# Patient Record
Sex: FEMALE | Race: WHITE
[De-identification: names, ages, dates, MRNs, and addresses within clinical notes are randomized per-mention and may not be internally consistent; named-entity substitution may affect disease eponyms.]

---

## 2017-05-09 ENCOUNTER — HOSPITAL ENCOUNTER (INPATIENT)
Dept: HOSPITAL 10 - E/R | Age: 77
LOS: 8 days | Discharge: NURSING, REHAB OR LONG-TERM CARE | DRG: 481 | End: 2017-05-17
Attending: INTERNAL MEDICINE | Admitting: INTERNAL MEDICINE
Payer: MEDICARE

## 2017-05-09 VITALS
WEIGHT: 162.92 LBS | WEIGHT: 162.92 LBS | HEIGHT: 62 IN | BODY MASS INDEX: 29.98 KG/M2 | BODY MASS INDEX: 29.98 KG/M2 | HEIGHT: 62 IN

## 2017-05-09 DIAGNOSIS — Z90.49: ICD-10-CM

## 2017-05-09 DIAGNOSIS — S72.142A: Primary | ICD-10-CM

## 2017-05-09 DIAGNOSIS — I10: ICD-10-CM

## 2017-05-09 DIAGNOSIS — R33.9: ICD-10-CM

## 2017-05-09 DIAGNOSIS — M19.90: ICD-10-CM

## 2017-05-09 DIAGNOSIS — R79.89: ICD-10-CM

## 2017-05-09 DIAGNOSIS — Z90.11: ICD-10-CM

## 2017-05-09 DIAGNOSIS — Z85.3: ICD-10-CM

## 2017-05-09 DIAGNOSIS — H40.9: ICD-10-CM

## 2017-05-09 DIAGNOSIS — N39.0: ICD-10-CM
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
## 2017-05-09 DIAGNOSIS — E88.81: ICD-10-CM

## 2017-05-09 LAB
ADD SCAN DIFF: NO
ALBUMIN SERPL-MCNC: 4 G/DL (ref 3.3–4.9)
ALBUMIN/GLOB SERPL: 1.29 {RATIO}
ALP SERPL-CCNC: 114 IU/L (ref 42–121)
ALT SERPL-CCNC: 27 IU/L (ref 13–69)
ANION GAP SERPL CALC-SCNC: 17 MMOL/L (ref 8–16)
APTT BLD: 25.5 SEC (ref 25–35)
AST SERPL-CCNC: 25 IU/L (ref 15–46)
BASOPHILS # BLD AUTO: 0 10^3/UL (ref 0–0.1)
BASOPHILS NFR BLD: 0.3 % (ref 0–2)
BILIRUB DIRECT SERPL-MCNC: 0 MG/DL (ref 0–0.2)
BILIRUB SERPL-MCNC: 0.2 MG/DL (ref 0.2–1.3)
BUN SERPL-MCNC: 19 MG/DL (ref 7–20)
CALCIUM SERPL-MCNC: 9 MG/DL (ref 8.4–10.2)
CHLORIDE SERPL-SCNC: 102 MMOL/L (ref 97–110)
CO2 SERPL-SCNC: 26 MMOL/L (ref 21–31)
CREAT SERPL-MCNC: 0.7 MG/DL (ref 0.44–1)
EOSINOPHIL # BLD: 0.1 10^3/UL (ref 0–0.5)
EOSINOPHIL NFR BLD: 0.8 % (ref 0–7)
ERYTHROCYTE [DISTWIDTH] IN BLOOD BY AUTOMATED COUNT: 13.4 % (ref 11.5–14.5)
GLOBULIN SER-MCNC: 3.1 G/DL (ref 1.3–3.2)
GLUCOSE SERPL-MCNC: 164 MG/DL (ref 70–220)
HCT VFR BLD CALC: 41.7 % (ref 37–47)
HGB BLD-MCNC: 13.8 G/DL (ref 12–16)
INR PPP: 0.92
LYMPHOCYTES # BLD AUTO: 3.7 10^3/UL (ref 0.8–2.9)
LYMPHOCYTES NFR BLD AUTO: 31.9 % (ref 15–51)
MCH RBC QN AUTO: 30.5 PG (ref 29–33)
MCHC RBC AUTO-ENTMCNC: 33.1 G/DL (ref 32–37)
MCV RBC AUTO: 92.3 FL (ref 82–101)
MONOCYTES # BLD: 0.8 10^3/UL (ref 0.3–0.9)
MONOCYTES NFR BLD: 6.6 % (ref 0–11)
NEUTROPHILS # BLD: 6.9 10^3/UL (ref 1.6–7.5)
NEUTROPHILS NFR BLD AUTO: 59.8 % (ref 39–77)
NRBC # BLD MANUAL: 0 10^3/UL (ref 0–0)
NRBC BLD QL: 0 /100WBC (ref 0–0)
PLATELET # BLD: 280 10^3/UL (ref 140–415)
PMV BLD AUTO: 9.8 FL (ref 7.4–10.4)
POTASSIUM SERPL-SCNC: 3.9 MMOL/L (ref 3.5–5.1)
PROT SERPL-MCNC: 7.1 G/DL (ref 6.1–8.1)
PROTHROMBIN TIME: 12.4 SEC (ref 12.2–14.2)
PT RATIO: 1
RBC # BLD AUTO: 4.52 10^6/UL (ref 4.2–5.4)
SODIUM SERPL-SCNC: 141 MMOL/L (ref 135–144)
TROPONIN I SERPL-MCNC: < 0.012 NG/ML (ref 0–0.12)
WBC # BLD AUTO: 11.5 10^3/UL (ref 4.8–10.8)

## 2017-05-09 PROCEDURE — 84439 ASSAY OF FREE THYROXINE: CPT

## 2017-05-09 PROCEDURE — 97530 THERAPEUTIC ACTIVITIES: CPT

## 2017-05-09 PROCEDURE — 84484 ASSAY OF TROPONIN QUANT: CPT

## 2017-05-09 PROCEDURE — C1713 ANCHOR/SCREW BN/BN,TIS/BN: HCPCS

## 2017-05-09 PROCEDURE — 84480 ASSAY TRIIODOTHYRONINE (T3): CPT

## 2017-05-09 PROCEDURE — 97110 THERAPEUTIC EXERCISES: CPT

## 2017-05-09 PROCEDURE — 85730 THROMBOPLASTIN TIME PARTIAL: CPT

## 2017-05-09 PROCEDURE — 97162 PT EVAL MOD COMPLEX 30 MIN: CPT

## 2017-05-09 PROCEDURE — 71010: CPT

## 2017-05-09 PROCEDURE — 73510: CPT

## 2017-05-09 PROCEDURE — 84100 ASSAY OF PHOSPHORUS: CPT

## 2017-05-09 PROCEDURE — 73530: CPT

## 2017-05-09 PROCEDURE — 96374 THER/PROPH/DIAG INJ IV PUSH: CPT

## 2017-05-09 PROCEDURE — 83540 ASSAY OF IRON: CPT

## 2017-05-09 PROCEDURE — 72170 X-RAY EXAM OF PELVIS: CPT

## 2017-05-09 PROCEDURE — 84443 ASSAY THYROID STIM HORMONE: CPT

## 2017-05-09 PROCEDURE — 76705 ECHO EXAM OF ABDOMEN: CPT

## 2017-05-09 PROCEDURE — 82306 VITAMIN D 25 HYDROXY: CPT

## 2017-05-09 PROCEDURE — 83735 ASSAY OF MAGNESIUM: CPT

## 2017-05-09 PROCEDURE — 81003 URINALYSIS AUTO W/O SCOPE: CPT

## 2017-05-09 PROCEDURE — 80048 BASIC METABOLIC PNL TOTAL CA: CPT

## 2017-05-09 PROCEDURE — 85025 COMPLETE CBC W/AUTO DIFF WBC: CPT

## 2017-05-09 PROCEDURE — 80053 COMPREHEN METABOLIC PANEL: CPT

## 2017-05-09 PROCEDURE — 85610 PROTHROMBIN TIME: CPT

## 2017-05-09 PROCEDURE — 83690 ASSAY OF LIPASE: CPT

## 2017-05-09 PROCEDURE — 81001 URINALYSIS AUTO W/SCOPE: CPT

## 2017-05-09 PROCEDURE — 82728 ASSAY OF FERRITIN: CPT

## 2017-05-09 PROCEDURE — 96375 TX/PRO/DX INJ NEW DRUG ADDON: CPT

## 2017-05-09 PROCEDURE — 93005 ELECTROCARDIOGRAM TRACING: CPT

## 2017-05-09 NOTE — RADRPT
PROCEDURE: XR Pelvis

 

CLINICAL INDICATION: Suspected fracture

 

TECHNIQUE:   An AP radiograph was submitted.

 

COMPARISON:   None

 

FINDINGS:

 

Osseous structures: There is a nondisplaced intertrochanteric fracture involving the proximal left f
emur.  The osseous elements otherwise appear intact.

 

Joint spaces: The hip joints appear unremarkable.  There is no distension of either joint capsule.  
the sacroiliac joints appear unremarkable without significant erosions or sclerosis.

 

Soft tissues:  appear unremarkable.

 

IMPRESSION: Nondisplaced intertrochanteric fracture of the proximal left femur.

_____________________________________________ 

Physician Dallas           Date    Time 

Electronically viewed and signed by Physician Dallas on 05/09/2017 23:21 

 

D:  05/09/2017 23:21  T:  05/09/2017 23:21

/

## 2017-05-09 NOTE — RADRPT
PROCEDURE: XR Left Hip

 

CLINICAL INDICATION:   Suspected fracture

 

TECHNIQUE:   3 views were submitted

 

COMPARISON:   None

 

FINDINGS:

 

Osseous structures: There is an intertrochanteric fracture through the proximal left femur without s
ignificant displacement.

 

Joint spaces: The hip joint is anatomically maintained.

 

Soft tissues:  appear unremarkable.

 

IMPRESSION: Nondisplaced intertrochanteric fracture involving the proximal left femur.

_____________________________________________ 

Physician [place]           Date    Time 

Electronically viewed and signed by LINNEA Maya Physician on 05/09/2017 23:20 

 

D:  05/09/2017 23:20  T:  05/09/2017 23:20

/

## 2017-05-09 NOTE — RADRPT
PROCEDURE:   XR Chest AP portable 

 

CLINICAL INDICATION:   Suspected fracture 

 

TECHNIQUE:   An AP portable radiograph of the chest was submitted. 

 

COMPARISON:   None. 

 

FINDINGS:

 

Support Hardware: None

 

Cardiovascular: The cardiovascular silhouette appears unremarkable.

 

Lung Fields: A focus of discoid atelectasis projects to the left costophrenic angle with the lung fi
elds otherwise clear.

 

Pleural Spaces: No pneumothorax or pleural effusion is identified.

 

Osseous Structures: Mild degenerative spine changes are noted and more extensive degenerative change
s seen about the AC joints.  No fracture is distinctly identified.

 

Soft Tissues: The soft tissues are quite generous.  Surgical staples project the right axilla.

 

IMPRESSION: 

1.  Focus of discoid atelectasis projects to the left costophrenic angle.

2.  Degenerative spine and AC joint changes.

3.  Previous right axillary surgery.

4.  No fracture is evident.

_____________________________________________ 

Physician Dallas           Date    Time 

Electronically viewed and signed by Physician [place] on 05/09/2017 23:24 

 

D:  05/09/2017 23:24  T:  05/09/2017 23:24

/

## 2017-05-09 NOTE — ERA
ER Documentation


Chief Complaint


Date/Time


DATE: 17 


TIME: 22:50


Chief Complaint


pain on left hip from fall 30 min ago





HPI


This is a 77-year-old female with a known history of hypertension that presents 

to the emergency department after she had a mechanical trip and fall just prior 

to arrival.  The patient stated she had dropped her keys and bent down to pick 

them up when she fell and landed on her left hip.  She landed on cement.  She 

states she did not hit her head or lose consciousness.  She was unable to get 

up due to severe pain of her left hip.  She denies any numbness or tingling of 

her left lower extremity.  The patient has a remote history of right breast 

carcinoma with mastectomy and indicates she is also had a colon resection 

several years prior to arrival.  She states the pain is 10 out of 10 in 

intensity.  She denies a headache or neck pain.  She denies abdominal pain.  

She denies any chest pain or pressure that radiates to the neck arm back or jaw 

and no shortness of breath





ROS


All systems reviewed and are negative except as per history of present illness.





Medications


Home Meds


Reported Medications


Brimonidine Tartrate* (Alphagan P*) 0.1%-15 Ml Opht Drops, 1 DROP BOTH EYES BID

, #1 EA


   17


Letrozole* (Letrozole*) 2.5 Mg Tablet, 2.5 MG PO DAILY, TAB


   17


Meloxicam* (Meloxicam*) 7.5 Mg Tablet, 7.5 MG PO DAILY, #30 TAB


   17


Lisinopril* (Lisinopril*) 5 Mg Tablet, 5 MG PO DAILY, #30 TAB


   17


Atorvastatin Calcium (Atorvastatin Calcium) 10 Mg Tab, 10 MG PO DAILY


   13


Loratadine* (Loratadine*) 10 Mg Tablet, 10 MG PO DAILY Y


   13


Calcium Carb/Vit D3/Minerals (CALCIUM 600 + D TABLET) 1 Each Tablet, 1 EACH PO 

BID


   13


Discontinued Reported Medications


Amlodipine Besylate* (Amlodipine Besylate*) 5 Mg Tablet, 5 MG PO DAILY


   13


[Cholesterol Med]   No Conflict Check


   13





Allergies


Allergies:  


Coded Allergies:  


     codeine (Verified  Allergy, Unknown, 17)


     morphine (Verified  Allergy, Unknown, 17)


Uncoded Allergies:  


     VICODINE (Allergy, Unknown, 17)





PMhx/Soc


History of Surgery:  Yes (, COLON RESECTION, GALL BLADDER, R BREAST SX)


Anesthesia Reaction:  No


Hx Neurological Disorder:  No


Hx Respiratory Disorders:  No


Hx Cardiac Disorders:  Yes (HTN)


Hx Psychiatric Problems:  No


Hx Miscellaneous Medical Probl:  No (right breast CA)


Hx Alcohol Use:  No


Hx Substance Use:  No


Hx Tobacco Use:  No


Smoking Status:  Never smoker





Physical Exam


Vitals





Vital Signs








  Date Time  Temp Pulse Resp B/P Pulse Ox O2 Delivery O2 Flow Rate FiO2


 


17 20:21 98.3 97 18 165/75 95   








Physical Exam


Constitutional:Well-developed. Well-nourished.


HEENT:Normocephalic. Atraumatic.Pupils were equal round reactive to light. 

Moist mucous membranes.No tonsillar exudates.  No nasoseptal hematoma.  No 

hemotympanum.


Neck: No nuchal rigidity. No lymphadenopathy. No posterior cervical spine 

tenderness or step-offs.


Respiratory: Not using accessory muscles of respiration.Lungs were clear to 

auscultation bilaterally. No rhonchi. No rales. No wheezing. 


Cardiovascular: Regular rate regular rhythm.No murmurs. No rubs were 

appreciated.S1, S2 normal. Distal pulses are palpable 2+ bilaterally.


GI: Abdomen was soft. Nontender. Non Distended. No pulsatile abdominal masses 

or bruits. No rebound. No guarding. Bowel sounds were present and normal. 


Muscle skeletal: Full range of motion the bilateral upper extremities.  Left 

lower extremity is shortened and externally rotated.  Patient is unable to move 

the left lower extremity due to pain.  Full range of motion of the right lower 

extremity.  No laxity on anterior posterior or lateral compression of the pelvis


Skin: No petechia, no purpura. No lesions on the palms or the soles of the 

feet. No maculopapular rash.


NEURO: Patient was alert, awake, orientated x3.No facial droop. Gait not 

observed due to suspected lower extremity fracture.Speech had regular rate and 

rhythm. No focal neurological deficits.


Result Diagram:  


17





Results 24 hrs





 Laboratory Tests








Test


  17


20:55


 


White Blood Count 11.510^3/ul 


 


Red Blood Count 4.5210^6/ul 


 


Hemoglobin 13.8g/dl 


 


Hematocrit 41.7% 


 


Mean Corpuscular Volume 92.3fl 


 


Mean Corpuscular Hemoglobin 30.5pg 


 


Mean Corpuscular Hemoglobin


Concent 33.1g/dl 


 


 


Red Cell Distribution Width 13.4% 


 


Platelet Count 65046^3/UL 


 


Mean Platelet Volume 9.8fl 


 


Neutrophils % 59.8% 


 


Lymphocytes % 31.9% 


 


Monocytes % 6.6% 


 


Eosinophils % 0.8% 


 


Basophils % 0.3% 


 


Nucleated Red Blood Cells % 0.0/100WBC 


 


Neutrophils # 6.910^3/ul 


 


Lymphocytes # 3.710^3/ul 


 


Monocytes # 0.810^3/ul 


 


Eosinophils # 0.110^3/ul 


 


Basophils # 0.010^3/ul 


 


Nucleated Red Blood Cells # 0.010^3/ul 


 


Prothrombin Time 12.4Sec 


 


Prothrombin Time Ratio 1.0 


 


INR International Normalized


Ratio 0.92 


 


 


Activated Partial


Thromboplast Time 25.5Sec 


 


 


Sodium Level 141mmol/L 


 


Potassium Level 3.9mmol/L 


 


Chloride Level 102mmol/L 


 


Carbon Dioxide Level 26mmol/L 


 


Anion Gap 17 


 


Blood Urea Nitrogen 19mg/dl 


 


Creatinine 0.70mg/dl 


 


Glucose Level 164mg/dl 


 


Calcium Level 9.0mg/dl 


 


Total Bilirubin 0.2mg/dl 


 


Direct Bilirubin 0.00mg/dl 


 


Indirect Bilirubin 0.2mg/dl 


 


Aspartate Amino Transf


(AST/SGOT) 25IU/L 


 


 


Alanine Aminotransferase


(ALT/SGPT) 27IU/L 


 


 


Alkaline Phosphatase 114IU/L 


 


Troponin I < 0.012ng/ml 


 


Total Protein 7.1g/dl 


 


Albumin 4.0g/dl 


 


Globulin 3.10g/dl 


 


Albumin/Globulin Ratio 1.29 








 Current Medications








 Medications


  (Trade)  Dose


 Ordered  Sig/Ros


 Route


 PRN Reason  Start Time


 Stop Time Status Last Admin


Dose Admin


 


 Sodium Chloride


  (NS)  1,000 ml @ 


 1,000 mls/hr  Q1H STAT


 IV


   17 20:42


 17 21:41 DC 17 21:19


 


 


 Hydromorphone HCl


  (Dilaudid)  1 mg  ONCE  STAT


 IV


   17 20:42


 17 20:45 DC 17 21:19


 


 


 Ondansetron HCl


  (Zofran Inj)  4 mg  ONCE  STAT


 IV


   17 20:42


 17 20:45 DC 17 21:19


 


 


 Metoclopramide HCl


  (Reglan)  10 mg  ONCE  ONCE


 IV


   17 23:00


 17 23:01 DC 17 23:06


 


 


 Ondansetron HCl


  (Zofran Inj)  4 mg  BRIDGE ORDER PRN


 IV


 NAUSEA AND/OR VOMITING  17 23:00


 5/10/17 22:59   


 


 


 Acetaminophen


  (Tylenol Tab)  650 mg  ER BRIDGE PRN


 PO


 MILD PAIN/FEVER  17 23:00


 5/10/17 22:59   


 











Procedures/MDM


This patient presented to the emergency department after she had a mechanical 

ground-level fall.  The patient immediately was placed in a cardiac monitor 

continuous pulse oximetry and IV access was established by nursing staff.  The 

patient states she has adverse reactions to morphine which makes her feel 

nauseous but denies any history of angioedema.





Due to the patient's severity of her pain she did receive intravenous Dilaudid 

with IV Zofran.  She did experience emesis after the analgesic medication was 

given 10 mg of Reglan IV and a liter bolus of 0.9 normal saline.





I obtained an EKG for preop care.  12 Lead EKG tracing ordered and reviewed by 

myself showed: 


Normal sinus rhythm of 89 bpm and no arrhythmia.


MI interval normal.


QRS duration normal.


No ST segment elevation


No ST segment depression.  Q waves present in the anterior septal leads V2 V3.





I obtained radiographic imaging of the patient's pelvis and left hip which did 

indicate the patient had a closed nondisplaced left intertrochanteric fracture.

  I will place a consult to the on-call orthopedic surgeon Dr. Saini and 

the patient will be admitted to the hospitalist  on day in serious condition 

with an anticipated stay of greater than 2 midnights.  The patient will be 

going to the medical surgical floor.  A Coleman catheter was placed given that 

the patient is unable to ambulate.





Departure


Diagnosis:  


 Primary Impression:  


 Closed left hip fracture


 Qualified Code:  S72.002A - Closed left hip fracture, initial encounter


 Additional Impression:  


 Fall from ground level


Condition:  Serious











MODE GOLDSTEIN May 9, 2017 22:55

## 2017-05-10 VITALS — SYSTOLIC BLOOD PRESSURE: 164 MMHG | HEART RATE: 96 BPM | DIASTOLIC BLOOD PRESSURE: 69 MMHG | RESPIRATION RATE: 22 BRPM

## 2017-05-10 VITALS — SYSTOLIC BLOOD PRESSURE: 153 MMHG | RESPIRATION RATE: 17 BRPM | DIASTOLIC BLOOD PRESSURE: 95 MMHG

## 2017-05-10 VITALS — HEART RATE: 88 BPM | RESPIRATION RATE: 19 BRPM | SYSTOLIC BLOOD PRESSURE: 139 MMHG | DIASTOLIC BLOOD PRESSURE: 59 MMHG

## 2017-05-10 VITALS — RESPIRATION RATE: 21 BRPM | SYSTOLIC BLOOD PRESSURE: 144 MMHG | HEART RATE: 92 BPM | DIASTOLIC BLOOD PRESSURE: 72 MMHG

## 2017-05-10 VITALS — DIASTOLIC BLOOD PRESSURE: 79 MMHG | HEART RATE: 90 BPM | RESPIRATION RATE: 18 BRPM | SYSTOLIC BLOOD PRESSURE: 161 MMHG

## 2017-05-10 VITALS — SYSTOLIC BLOOD PRESSURE: 128 MMHG | DIASTOLIC BLOOD PRESSURE: 66 MMHG | HEART RATE: 88 BPM | RESPIRATION RATE: 17 BRPM

## 2017-05-10 VITALS — DIASTOLIC BLOOD PRESSURE: 76 MMHG | HEART RATE: 90 BPM | RESPIRATION RATE: 23 BRPM | SYSTOLIC BLOOD PRESSURE: 142 MMHG

## 2017-05-10 VITALS — RESPIRATION RATE: 18 BRPM | DIASTOLIC BLOOD PRESSURE: 65 MMHG | SYSTOLIC BLOOD PRESSURE: 146 MMHG | HEART RATE: 92 BPM

## 2017-05-10 VITALS — RESPIRATION RATE: 18 BRPM | DIASTOLIC BLOOD PRESSURE: 50 MMHG | SYSTOLIC BLOOD PRESSURE: 126 MMHG

## 2017-05-10 VITALS — SYSTOLIC BLOOD PRESSURE: 133 MMHG | RESPIRATION RATE: 18 BRPM | DIASTOLIC BLOOD PRESSURE: 70 MMHG

## 2017-05-10 VITALS — RESPIRATION RATE: 18 BRPM | SYSTOLIC BLOOD PRESSURE: 166 MMHG | DIASTOLIC BLOOD PRESSURE: 86 MMHG | HEART RATE: 94 BPM

## 2017-05-10 VITALS — SYSTOLIC BLOOD PRESSURE: 153 MMHG | HEART RATE: 88 BPM | RESPIRATION RATE: 17 BRPM | DIASTOLIC BLOOD PRESSURE: 95 MMHG

## 2017-05-10 VITALS — SYSTOLIC BLOOD PRESSURE: 160 MMHG | RESPIRATION RATE: 17 BRPM | DIASTOLIC BLOOD PRESSURE: 78 MMHG | HEART RATE: 90 BPM

## 2017-05-10 VITALS — DIASTOLIC BLOOD PRESSURE: 79 MMHG | HEART RATE: 90 BPM | RESPIRATION RATE: 17 BRPM | SYSTOLIC BLOOD PRESSURE: 162 MMHG

## 2017-05-10 VITALS — DIASTOLIC BLOOD PRESSURE: 64 MMHG | SYSTOLIC BLOOD PRESSURE: 123 MMHG | RESPIRATION RATE: 17 BRPM | HEART RATE: 86 BPM

## 2017-05-10 VITALS — RESPIRATION RATE: 21 BRPM | DIASTOLIC BLOOD PRESSURE: 70 MMHG | SYSTOLIC BLOOD PRESSURE: 154 MMHG

## 2017-05-10 VITALS — TEMPERATURE: 98.3 F

## 2017-05-10 LAB
ALBUMIN SERPL-MCNC: 3.5 G/DL (ref 3.3–4.9)
ALBUMIN/GLOB SERPL: 1.29 {RATIO}
ALP SERPL-CCNC: 94 IU/L (ref 42–121)
ALT SERPL-CCNC: 56 IU/L (ref 13–69)
ANION GAP SERPL CALC-SCNC: 10 MMOL/L (ref 8–16)
AST SERPL-CCNC: 76 IU/L (ref 15–46)
BILIRUB DIRECT SERPL-MCNC: 0 MG/DL (ref 0–0.2)
BILIRUB SERPL-MCNC: 0.3 MG/DL (ref 0.2–1.3)
BUN SERPL-MCNC: 17 MG/DL (ref 7–20)
CALCIUM SERPL-MCNC: 8.6 MG/DL (ref 8.4–10.2)
CHLORIDE SERPL-SCNC: 107 MMOL/L (ref 97–110)
CO2 SERPL-SCNC: 26 MMOL/L (ref 21–31)
CREAT SERPL-MCNC: 0.58 MG/DL (ref 0.44–1)
GLOBULIN SER-MCNC: 2.7 G/DL (ref 1.3–3.2)
GLUCOSE SERPL-MCNC: 149 MG/DL (ref 70–220)
MAGNESIUM SERPL-MCNC: 1.8 MG/DL (ref 1.7–2.5)
PHOSPHATE SERPL-MCNC: 3.5 MG/DL (ref 2.5–4.9)
POTASSIUM SERPL-SCNC: 4.6 MMOL/L (ref 3.5–5.1)
PROT SERPL-MCNC: 6.2 G/DL (ref 6.1–8.1)
SODIUM SERPL-SCNC: 138 MMOL/L (ref 135–144)

## 2017-05-10 PROCEDURE — 0QS706Z REPOSITION LEFT UPPER FEMUR WITH INTRAMEDULLARY INTERNAL FIXATION DEVICE, OPEN APPROACH: ICD-10-PCS | Performed by: SPECIALIST

## 2017-05-10 RX ADMIN — DEXAMETHASONE SODIUM PHOSPHATE PRN MG: 10 INJECTION, SOLUTION INTRAMUSCULAR; INTRAVENOUS at 03:19

## 2017-05-10 RX ADMIN — CEFAZOLIN SCH MLS/HR: 1 INJECTION, POWDER, FOR SOLUTION INTRAMUSCULAR; INTRAVENOUS at 18:36

## 2017-05-10 RX ADMIN — ATORVASTATIN CALCIUM SCH MG: 10 TABLET, FILM COATED ORAL at 21:16

## 2017-05-10 RX ADMIN — LETROZOLE SCH MG: 2.5 TABLET, FILM COATED ORAL at 09:00

## 2017-05-10 RX ADMIN — DEXAMETHASONE SODIUM PHOSPHATE PRN MG: 10 INJECTION, SOLUTION INTRAMUSCULAR; INTRAVENOUS at 14:52

## 2017-05-10 NOTE — HPN
Date/Time of Note


Date/Time of Note


DATE: 5/10/17 


TIME: 16:30





Interval H&P Admission Note


Pt. seen H&P reviewed:  No system changes











TERRY MONSON MD May 10, 2017 16:30

## 2017-05-10 NOTE — PN
Date/Time of Note


Date/Time of Note


DATE: 5/10/17 


TIME: 15:09





Assessment/Plan


VTE Prophylaxis


VTE Prophylaxis Intervention:  contraindicated





Lines/Catheters


IV Catheter Type (from [facility]):  Saline Lock


Urinary Cath still in place:  Yes


Reason Cath still needed:  urinary retention, skin wounds contaminated by urine





Assessment/Plan


Chief Complaint/Hosp Course


S- events.





O- vss





PE -deferred





A/P


1) Lt hip fracture; stable for surgery


2) Ftt; snf?


3) Djd


4) Htn


5) GERD


6) Rt Ca Breast; sp partial mastectomy and axillary/ ln dissection. 


7) Ho Colon resection.


Problems:  





Exam/Review of Systems


Vital Signs


Vitals





 Vital Signs








  Date Time  Temp Pulse Resp B/P Pulse Ox O2 Delivery O2 Flow Rate FiO2


 


5/10/17 08:04 98.1 99 21 154/70 92   


 


5/9/17 23:00      Nasal Cannula 2.0 














 Intake and Output   


 


 5/9/17 5/9/17 5/10/17





 14:59 22:59 06:59


 


Intake Total   150 ml


 


Output Total   350 ml


 


Balance   -200 ml











Results


Result Diagram:  


5/9/17 2055                                                                    

            5/10/17 0516





Results 24 hrs





Laboratory Tests








Test


  5/9/17


20:55 5/10/17


05:16


 


White Blood Count 11.5  H 


 


Red Blood Count 4.52   


 


Hemoglobin 13.8   


 


Hematocrit 41.7   


 


Mean Corpuscular Volume 92.3   


 


Mean Corpuscular Hemoglobin 30.5   


 


Mean Corpuscular Hemoglobin


Concent 33.1  


  


 


 


Red Cell Distribution Width 13.4   


 


Platelet Count 280   


 


Mean Platelet Volume 9.8   


 


Neutrophils % 59.8   


 


Lymphocytes % 31.9   


 


Monocytes % 6.6   


 


Eosinophils % 0.8   


 


Basophils % 0.3   


 


Nucleated Red Blood Cells % 0.0   


 


Neutrophils # 6.9   


 


Lymphocytes # 3.7  H 


 


Monocytes # 0.8   


 


Eosinophils # 0.1   


 


Basophils # 0.0   


 


Nucleated Red Blood Cells # 0.0   


 


Prothrombin Time 12.4   


 


Prothrombin Time Ratio 1.0   


 


INR International Normalized


Ratio 0.92  


  


 


 


Activated Partial


Thromboplast Time 25.5  


  


 


 


Sodium Level 141   138  


 


Potassium Level 3.9   4.6  


 


Chloride Level 102   107  


 


Carbon Dioxide Level 26   26  


 


Anion Gap 17  H 10  #


 


Blood Urea Nitrogen 19   17  


 


Creatinine 0.70   0.58  


 


Glucose Level 164   149  


 


Calcium Level 9.0   8.6  


 


Total Bilirubin 0.2   0.3  


 


Direct Bilirubin 0.00   0.00  


 


Indirect Bilirubin 0.2   0.3  


 


Aspartate Amino Transf


(AST/SGOT) 25  


  76  H


 


 


Alanine Aminotransferase


(ALT/SGPT) 27  


  56  


 


 


Alkaline Phosphatase 114   94  


 


Troponin I < 0.012   


 


Total Protein 7.1   6.2  


 


Albumin 4.0   3.5  


 


Globulin 3.10   2.70  


 


Albumin/Globulin Ratio 1.29   1.29  


 


Phosphorus Level  3.5  


 


Magnesium Level  1.8  











Medications


Medications





 Current Medications


Atorvastatin Calcium (Lipitor) 10 mg DAILY@21 PO ;  Start 5/10/17 at 21:00


Brimonidine Tartrate (Alphagan P 0.1%) 1 drop BID BOTH EYES  Last administered 

on 5/10/17at 09:55; Admin Dose 1 DROP;  Start 5/10/17 at 09:00


Letrozole (Femara) 2.5 mg DAILY PO ;  Start 5/10/17 at 09:00


Lisinopril (Zestril) 5 mg DAILY PO ;  Start 5/10/17 at 09:00


Loratadine (Claritin) 10 mg DAILY  PRN PO NASAL CONGESTION;  Start 5/10/17 at 02

:00


Meloxicam (Mobic) 7.5 mg DAILY PO ;  Start 5/10/17 at 09:00


Calcium/Vitamin D (Oyster Shell/ Vit-D (500/200)) 1 tab BID PO ;  Start 5/10/17 

at 09:00


Ondansetron HCl (Zofran Inj) 4 mg Q6H  PRN IV NAUSEA AND/OR VOMITING Last 

administered on 5/10/17at 14:52; Admin Dose 4 MG;  Start 5/10/17 at 03:00


Acetaminophen (Tylenol Tab) 650 mg Q6H  PRN PO PAIN AND OR ELEVATED TEMP;  

Start 5/10/17 at 03:00


Tramadol HCl (Ultram) 50 mg Q6H  PRN PO PAIN;  Start 5/10/17 at 03:06


Hydromorphone HCl (Dilaudid) 1 mg Q4H  PRN IV PAIN;  Start 5/10/17 at 06:00











ALYSSIA KOWALSKI MD May 10, 2017 15:17

## 2017-05-10 NOTE — RADRPT
PROCEDURE:   Intraoperative imaging of the left hip with fluoroscopy.  

 

CLINICAL INDICATION:   Left hip pain.  Fracture.  Intraoperative. 

 

TECHNIQUE:   10 images of the left hip were obtained in the operating room with an image intensifier
.  No radiologist was in attendance.  106.6 seconds of fluoroscopy time was used. 

 

COMPARISON:   Left hip radiographs dated 05/09/2017. 

 

FINDINGS:

Images demonstrate open reduction and internal fixation of the left hip with a suze in the shaft of t
he femur and a screw in the neck of the femur.  Alignment is satisfactory.

 

IMPRESSION:

1.  Satisfactory intraoperative imaging of the left hip.

 

RPTAT: QQ

_____________________________________________ 

.Ender Mckeon MD, MD           Date    Time 

Electronically viewed and signed by .Ender Mckeon MD, MD on 05/10/2017 19:23 

 

D:  05/10/2017 19:23  T:  05/10/2017 19:23

.R/

## 2017-05-11 VITALS — SYSTOLIC BLOOD PRESSURE: 119 MMHG | RESPIRATION RATE: 17 BRPM | DIASTOLIC BLOOD PRESSURE: 58 MMHG

## 2017-05-11 VITALS — DIASTOLIC BLOOD PRESSURE: 58 MMHG | SYSTOLIC BLOOD PRESSURE: 129 MMHG | RESPIRATION RATE: 16 BRPM

## 2017-05-11 LAB
ADD SCAN DIFF: NO
ANION GAP SERPL CALC-SCNC: 9 MMOL/L (ref 8–16)
BASOPHILS # BLD AUTO: 0 10^3/UL (ref 0–0.1)
BASOPHILS NFR BLD: 0 % (ref 0–2)
BUN SERPL-MCNC: 21 MG/DL (ref 7–20)
CALCIUM SERPL-MCNC: 8 MG/DL (ref 8.4–10.2)
CHLORIDE SERPL-SCNC: 107 MMOL/L (ref 97–110)
CO2 SERPL-SCNC: 25 MMOL/L (ref 21–31)
CREAT SERPL-MCNC: 0.74 MG/DL (ref 0.44–1)
EOSINOPHIL # BLD: 0 10^3/UL (ref 0–0.5)
EOSINOPHIL NFR BLD: 0 % (ref 0–7)
ERYTHROCYTE [DISTWIDTH] IN BLOOD BY AUTOMATED COUNT: 13.5 % (ref 11.5–14.5)
GLUCOSE SERPL-MCNC: 159 MG/DL (ref 70–220)
HCT VFR BLD CALC: 30.5 % (ref 37–47)
HGB BLD-MCNC: 9.9 G/DL (ref 12–16)
LYMPHOCYTES # BLD AUTO: 0.9 10^3/UL (ref 0.8–2.9)
LYMPHOCYTES NFR BLD AUTO: 9.7 % (ref 15–51)
MAGNESIUM SERPL-MCNC: 2.1 MG/DL (ref 1.7–2.5)
MCH RBC QN AUTO: 30.1 PG (ref 29–33)
MCHC RBC AUTO-ENTMCNC: 32.5 G/DL (ref 32–37)
MCV RBC AUTO: 92.7 FL (ref 82–101)
MONOCYTES # BLD: 0.4 10^3/UL (ref 0.3–0.9)
MONOCYTES NFR BLD: 4.3 % (ref 0–11)
NEUTROPHILS # BLD: 8.3 10^3/UL (ref 1.6–7.5)
NEUTROPHILS NFR BLD AUTO: 85.6 % (ref 39–77)
NRBC # BLD MANUAL: 0 10^3/UL (ref 0–0)
NRBC BLD QL: 0 /100WBC (ref 0–0)
PHOSPHATE SERPL-MCNC: 4.2 MG/DL (ref 2.5–4.9)
PLATELET # BLD: 185 10^3/UL (ref 140–415)
PMV BLD AUTO: 9.8 FL (ref 7.4–10.4)
POTASSIUM SERPL-SCNC: 4.1 MMOL/L (ref 3.5–5.1)
RBC # BLD AUTO: 3.29 10^6/UL (ref 4.2–5.4)
SODIUM SERPL-SCNC: 137 MMOL/L (ref 135–144)
TSH SERPL-ACNC: 0.37 MIU/L (ref 0.47–4.68)
WBC # BLD AUTO: 9.7 10^3/UL (ref 4.8–10.8)

## 2017-05-11 RX ADMIN — LISINOPRIL SCH MG: 5 TABLET ORAL at 08:52

## 2017-05-11 RX ADMIN — CEFAZOLIN SCH MLS/HR: 1 INJECTION, POWDER, FOR SOLUTION INTRAMUSCULAR; INTRAVENOUS at 13:01

## 2017-05-11 RX ADMIN — LETROZOLE SCH MG: 2.5 TABLET, FILM COATED ORAL at 10:48

## 2017-05-11 RX ADMIN — DEXAMETHASONE SODIUM PHOSPHATE PRN MG: 10 INJECTION, SOLUTION INTRAMUSCULAR; INTRAVENOUS at 13:57

## 2017-05-11 RX ADMIN — ATORVASTATIN CALCIUM SCH MG: 10 TABLET, FILM COATED ORAL at 21:12

## 2017-05-11 RX ADMIN — CEFAZOLIN SCH MLS/HR: 1 INJECTION, POWDER, FOR SOLUTION INTRAMUSCULAR; INTRAVENOUS at 06:32

## 2017-05-11 NOTE — OPR
DATE OF OPERATION:  05/10/2017

 

 

PREOPERATIVE DIAGNOSIS:  Left hip displaced intertrochanteric fracture.

 

POSTOPERATIVE DIAGNOSIS:  Left hip displaced intertrochanteric fracture.

 

PROCEDURE PERFORMED:  

1.  Left hip locked intramedullary nailing using Smith and Nephew Intertan nail 11.5 mm, 34 cm in le
ngth, 85 mm lag screw.

3.  Closed reduction of left hip fracture.

4.  Interpretation of intraoperative fluoroscopy x-ray.

 

SURGEON: Janis Arce MD

 

ANESTHESIA:  General.

 

ESTIMATED BLOOD LOSS:  100 mL.

 

COMPLICATIONS:  None.

 

DESCRIPTION OF PROCEDURE:  The patient was taken to the operating room and general anesthetic given 
with intubation.  Two grams of Kefzol were given for prophylaxis.  The patient also given nerve bloc
k by anesthesia.  The patient carefully positioned on the fracture table with padding of all extremi
ties.  Left hip prepped and draped in the usual sterile manner.  C-arm draped and brought over the l
eft hip.  

 

A 2 inch incision was made proximal to the trochanter.  The fascia divided, guide pin inserted in th
e femoral shaft through the tip of the trochanter and the guide suze followed, reaming to size 13 and
 an 11.5 mm nail was placed satisfactorily.  Good alignment of the fracture after traction and manip
ulation of the leg.  A guide pin inserted into the femoral head and an 85 mm lag screw was inserted 
satisfactorily with good compression of the fracture and locking of the lag screw.  Final x-ray was 
satisfactory in AP and lateral views.  Wound irrigated with antibiotic solution.  Hemostasis ascerta
ined using cautery.  Fascia closed with #1 Vicryl suture, skin closed with staples.  Compression ban
dage applied.  Anesthetic reversed.  The patient was taken to recovery in stable condition.

 

 

Dictated By: JANIS DOBBS/MARQUITA

DD:    05/11/2017 08:29:03

DT:    05/11/2017 09:48:26

Conf#: [id number]

DID#:  [id number]

## 2017-05-11 NOTE — OPPN
Date/Time of Note


Date/Time of Note


DATE: 5/11/17 


TIME: 10:25





Post-Anesthesia Notes


Post-Anesthesia Note


Last documented vital signs





Vital Signs








  Date Time  Temp Pulse Resp B/P Pulse Ox O2 Delivery O2 Flow Rate FiO2


 


5/11/17 08:13 98.1 86 16 129/58 96   


 


5/10/17 18:48      Nasal Cannula 2.0 








Activity:  WNL


Respiratory function:  WNL


Cardiovascular function:  WNL


Mental status:  Baseline


Pain reasonably controlled:  Yes


Hydration appropriate:  Yes


Nausea/Vomiting absent:  Yes











RIKI HOPE MD May 11, 2017 10:25

## 2017-05-11 NOTE — OPR
DATE OF OPERATION:  

 

 

CHIEF COMPLAINT:  Left hip pain.

 

HISTORY: The patient is a 77-year-old male who sustained a ground level fall. She presented to the e
mergency room with pain in the left hip and difficulty moving the leg.  Skin intact.  Neurovascular 
status intact.  Compartments are soft.  

 

X-rays revealed a displaced comminuted left hip intertrochanteric fracture.

 

The findings were explained to the patient.  Recommendation is for surgery consisting of locked intr
amedullary nailing.  The risks and complications were reviewed. The patient agrees to proceed with s
urgmerritt.

 

 

Dictated By: TERRY DOBBS/NTS

DD:    05/11/2017 08:26:10

DT:    05/11/2017 09:23:30

Conf#: [id number]

DID#:  [id number]

## 2017-05-12 VITALS — RESPIRATION RATE: 18 BRPM | SYSTOLIC BLOOD PRESSURE: 132 MMHG | DIASTOLIC BLOOD PRESSURE: 63 MMHG

## 2017-05-12 VITALS — DIASTOLIC BLOOD PRESSURE: 61 MMHG | RESPIRATION RATE: 18 BRPM | SYSTOLIC BLOOD PRESSURE: 124 MMHG

## 2017-05-12 LAB
ADD SCAN DIFF: NO
ANION GAP SERPL CALC-SCNC: 7 MMOL/L (ref 8–16)
BASOPHILS # BLD AUTO: 0 10^3/UL (ref 0–0.1)
BASOPHILS NFR BLD: 0.4 % (ref 0–2)
BUN SERPL-MCNC: 26 MG/DL (ref 7–20)
CALCIUM SERPL-MCNC: 7.9 MG/DL (ref 8.4–10.2)
CHLORIDE SERPL-SCNC: 108 MMOL/L (ref 97–110)
CO2 SERPL-SCNC: 25 MMOL/L (ref 21–31)
CREAT SERPL-MCNC: 0.71 MG/DL (ref 0.44–1)
EOSINOPHIL # BLD: 0.1 10^3/UL (ref 0–0.5)
EOSINOPHIL NFR BLD: 1 % (ref 0–7)
ERYTHROCYTE [DISTWIDTH] IN BLOOD BY AUTOMATED COUNT: 13.9 % (ref 11.5–14.5)
FERRITIN SERPL-MCNC: 67.3 NG/ML (ref 11.1–264)
GLUCOSE SERPL-MCNC: 110 MG/DL (ref 70–220)
HCT VFR BLD CALC: 30.7 % (ref 37–47)
HGB BLD-MCNC: 9.4 G/DL (ref 12–16)
IRON SERPL-MCNC: 84 UG/DL (ref 35–150)
LYMPHOCYTES # BLD AUTO: 3.2 10^3/UL (ref 0.8–2.9)
LYMPHOCYTES NFR BLD AUTO: 29.4 % (ref 15–51)
MAGNESIUM SERPL-MCNC: 2.2 MG/DL (ref 1.7–2.5)
MCH RBC QN AUTO: 29.4 PG (ref 29–33)
MCHC RBC AUTO-ENTMCNC: 30.6 G/DL (ref 32–37)
MCV RBC AUTO: 95.9 FL (ref 82–101)
MONOCYTES # BLD: 0.8 10^3/UL (ref 0.3–0.9)
MONOCYTES NFR BLD: 7.8 % (ref 0–11)
NEUTROPHILS # BLD: 6.5 10^3/UL (ref 1.6–7.5)
NEUTROPHILS NFR BLD AUTO: 61 % (ref 39–77)
NRBC # BLD MANUAL: 0 10^3/UL (ref 0–0)
NRBC BLD QL: 0 /100WBC (ref 0–0)
PHOSPHATE SERPL-MCNC: 3 MG/DL (ref 2.5–4.9)
PLATELET # BLD: 163 10^3/UL (ref 140–415)
PMV BLD AUTO: 10.5 FL (ref 7.4–10.4)
POTASSIUM SERPL-SCNC: 4.2 MMOL/L (ref 3.5–5.1)
RBC # BLD AUTO: 3.2 10^6/UL (ref 4.2–5.4)
SODIUM SERPL-SCNC: 136 MMOL/L (ref 135–144)
T3 SERPL-MCNC: 0.9 NG/ML (ref 0.97–1.69)
TIBC SERPL-MCNC: 258 UG/DL (ref 241–421)
WBC # BLD AUTO: 10.7 10^3/UL (ref 4.8–10.8)

## 2017-05-12 RX ADMIN — DEXAMETHASONE SODIUM PHOSPHATE PRN MG: 10 INJECTION, SOLUTION INTRAMUSCULAR; INTRAVENOUS at 10:41

## 2017-05-12 RX ADMIN — FAMOTIDINE SCH MG: 20 TABLET ORAL at 08:44

## 2017-05-12 RX ADMIN — DEXAMETHASONE SODIUM PHOSPHATE PRN MG: 10 INJECTION, SOLUTION INTRAMUSCULAR; INTRAVENOUS at 20:19

## 2017-05-12 RX ADMIN — DEXAMETHASONE SODIUM PHOSPHATE PRN MG: 10 INJECTION, SOLUTION INTRAMUSCULAR; INTRAVENOUS at 14:37

## 2017-05-12 RX ADMIN — DOCUSATE SODIUM AND SENNOSIDES SCH TAB: 8.6; 5 TABLET, FILM COATED ORAL at 20:18

## 2017-05-12 RX ADMIN — LETROZOLE SCH MG: 2.5 TABLET, FILM COATED ORAL at 10:24

## 2017-05-12 RX ADMIN — LISINOPRIL SCH MG: 5 TABLET ORAL at 08:43

## 2017-05-12 RX ADMIN — ENOXAPARIN SODIUM SCH MG: 100 INJECTION SUBCUTANEOUS at 17:13

## 2017-05-12 RX ADMIN — KETOROLAC TROMETHAMINE PRN MG: 30 INJECTION, SOLUTION INTRAMUSCULAR at 20:19

## 2017-05-12 RX ADMIN — Medication SCH CAP: at 20:18

## 2017-05-12 NOTE — PN
Date/Time of Note


Date/Time of Note


DATE: 5/12/17 


TIME: 14:11





Assessment/Plan


VTE Prophylaxis


VTE Prophylaxis Intervention:  LMWH





Lines/Catheters


IV Catheter Type (from Nrs):  Saline Lock


Urinary Cath still in place:  Yes


Reason Cath still needed:  skin wounds contaminated by urine





Assessment/Plan


Chief Complaint/Hosp Course


S- events.


5/12 sat up w PT. Nausea.





O- vss





PE


No pallor/ adenopathy


Reg


Ctab


B + nt nd no r/r/g


No edema.  Lt hip C/C/I





A/P


1) Lt hip fracture; sp orif; stable; ttwb/lovenox. dc to snf 5/13 if ok w neuro.


2) Ftt; snf?


3) Djd


4) Htn


5) GERD


6) Rt Ca Breast; sp partial mastectomy and axillary/ ln dissection. 


7) Ho Colon resection.


8) SB-


Problems:  





Exam/Review of Systems


Vital Signs


Vitals





 Vital Signs








  Date Time  Temp Pulse Resp B/P Pulse Ox O2 Delivery O2 Flow Rate FiO2


 


5/12/17 07:35 98.8 87 18 124/61 92   


 


5/10/17 18:48      Nasal Cannula 2.0 














 Intake and Output   


 


 5/11/17 5/11/17 5/12/17





 15:00 23:00 07:00


 


Intake Total 100 ml 680 ml 


 


Output Total  400 ml 


 


Balance 100 ml 280 ml 











Results


Result Diagram:  


5/12/17 0540                                                                   

             5/12/17 0540





Results 24 hrs





Laboratory Tests








Test


  5/12/17


05:40


 


White Blood Count 10.7  


 


Red Blood Count 3.20  L


 


Hemoglobin 9.4  L


 


Hematocrit 30.7  L


 


Mean Corpuscular Volume 95.9  


 


Mean Corpuscular Hemoglobin 29.4  


 


Mean Corpuscular Hemoglobin


Concent 30.6  L


 


 


Red Cell Distribution Width 13.9  


 


Platelet Count 163  


 


Mean Platelet Volume 10.5  H


 


Neutrophils % 61.0  


 


Lymphocytes % 29.4  


 


Monocytes % 7.8  


 


Eosinophils % 1.0  


 


Basophils % 0.4  


 


Nucleated Red Blood Cells % 0.0  


 


Neutrophils # 6.5  


 


Lymphocytes # 3.2  H


 


Monocytes # 0.8  


 


Eosinophils # 0.1  


 


Basophils # 0.0  


 


Nucleated Red Blood Cells # 0.0  


 


Sodium Level 136  


 


Potassium Level 4.2  


 


Chloride Level 108  


 


Carbon Dioxide Level 25  


 


Anion Gap 7  L


 


Blood Urea Nitrogen 26  H


 


Creatinine 0.71  


 


Glucose Level 110  #


 


Calcium Level 7.9  L


 


Phosphorus Level 3.0  


 


Magnesium Level 2.2  


 


Iron Level 84  


 


Total Iron Binding Capacity 258  


 


Percent Iron Saturation 33  


 


Ferritin 67.3  


 


Free Thyroxine 1.71  


 


Total Triiodothyronine 0.90  L











Medications


Medications





 Current Medications


Atorvastatin Calcium (Lipitor) 10 mg DAILY@21 PO  Last administered on 5/11/ 17at 21:12; Admin Dose 10 MG;  Start 5/10/17 at 21:00


Brimonidine Tartrate (Alphagan P 0.1%) 1 drop BID BOTH EYES  Last administered 

on 5/12/17at 08:43; Admin Dose 1 DROP;  Start 5/10/17 at 09:00


Letrozole (Femara) 2.5 mg DAILY PO  Last administered on 5/12/17at 10:24; Admin 

Dose 2.5 MG;  Start 5/10/17 at 09:00


Ondansetron HCl (Zofran Inj) 4 mg Q6H  PRN IV NAUSEA AND/OR VOMITING Last 

administered on 5/12/17at 10:41; Admin Dose 4 MG;  Start 5/10/17 at 03:00


Acetaminophen (Tylenol Tab) 650 mg Q6H  PRN PO PAIN AND OR ELEVATED TEMP;  

Start 5/10/17 at 03:00


Tramadol HCl (Ultram) 50 mg Q6H  PRN PO PAIN;  Start 5/10/17 at 03:06


Hydromorphone HCl (Dilaudid) 1 mg Q4H  PRN IV PAIN;  Start 5/10/17 at 06:00


Calcium/Vitamin D (Oyster Shell/ Vit-D (500/200)) 1 tab BID PO  Last 

administered on 5/12/17at 08:43; Admin Dose 1 TAB;  Start 5/12/17 at 09:00


Lisinopril (Zestril) 5 mg DAILY PO  Last administered on 5/12/17at 08:43; Admin 

Dose 5 MG;  Start 5/11/17 at 09:00


Famotidine (Pepcid) 20 mg DAILY PO  Last administered on 5/12/17at 08:44; Admin 

Dose 20 MG;  Start 5/12/17 at 09:00


Oxycodone/ Acetaminophen (Endocet (10/ 325)) 1 tab Q4H  PRN PO PAIN Last 

administered on 5/12/17at 08:45; Admin Dose 1 TAB;  Start 5/11/17 at 13:00


Zolpidem Tartrate (Ambien) 10 mg HS  PRN PO INSOMNIA Last administered on 5/12/ 17at 01:28; Admin Dose 10 MG;  Start 5/12/17 at 01:30


Lactobacillus Acidophilus/ Rhamnosus (Culturelle) 1 cap BID PO ;  Start 5/12/17 

at 21:00











ALYSSIA KOWALSKI MD May 12, 2017 14:14

## 2017-05-13 VITALS — SYSTOLIC BLOOD PRESSURE: 127 MMHG | DIASTOLIC BLOOD PRESSURE: 60 MMHG | RESPIRATION RATE: 20 BRPM

## 2017-05-13 VITALS — DIASTOLIC BLOOD PRESSURE: 66 MMHG | RESPIRATION RATE: 20 BRPM | SYSTOLIC BLOOD PRESSURE: 129 MMHG

## 2017-05-13 LAB
ADD SCAN DIFF: NO
ALBUMIN SERPL-MCNC: 3 G/DL (ref 3.3–4.9)
ALBUMIN/GLOB SERPL: 1.15 {RATIO}
ALP SERPL-CCNC: 196 IU/L (ref 42–121)
ALT SERPL-CCNC: 126 IU/L (ref 13–69)
ANION GAP SERPL CALC-SCNC: 7 MMOL/L (ref 8–16)
AST SERPL-CCNC: 115 IU/L (ref 15–46)
BASOPHILS # BLD AUTO: 0 10^3/UL (ref 0–0.1)
BASOPHILS NFR BLD: 0.3 % (ref 0–2)
BILIRUB DIRECT SERPL-MCNC: 0 MG/DL (ref 0–0.2)
BILIRUB SERPL-MCNC: 0.5 MG/DL (ref 0.2–1.3)
BUN SERPL-MCNC: 24 MG/DL (ref 7–20)
CALCIUM SERPL-MCNC: 8.1 MG/DL (ref 8.4–10.2)
CHLORIDE SERPL-SCNC: 104 MMOL/L (ref 97–110)
CO2 SERPL-SCNC: 27 MMOL/L (ref 21–31)
CREAT SERPL-MCNC: 0.8 MG/DL (ref 0.44–1)
EOSINOPHIL # BLD: 0.2 10^3/UL (ref 0–0.5)
EOSINOPHIL NFR BLD: 1.8 % (ref 0–7)
ERYTHROCYTE [DISTWIDTH] IN BLOOD BY AUTOMATED COUNT: 13.7 % (ref 11.5–14.5)
GLOBULIN SER-MCNC: 2.6 G/DL (ref 1.3–3.2)
GLUCOSE SERPL-MCNC: 155 MG/DL (ref 70–220)
HCT VFR BLD CALC: 28.6 % (ref 37–47)
HGB BLD-MCNC: 9.2 G/DL (ref 12–16)
LYMPHOCYTES # BLD AUTO: 2.1 10^3/UL (ref 0.8–2.9)
LYMPHOCYTES NFR BLD AUTO: 21.7 % (ref 15–51)
MAGNESIUM SERPL-MCNC: 2.3 MG/DL (ref 1.7–2.5)
MCH RBC QN AUTO: 30.4 PG (ref 29–33)
MCHC RBC AUTO-ENTMCNC: 32.2 G/DL (ref 32–37)
MCV RBC AUTO: 94.4 FL (ref 82–101)
MONOCYTES # BLD: 0.6 10^3/UL (ref 0.3–0.9)
MONOCYTES NFR BLD: 6.4 % (ref 0–11)
NEUTROPHILS # BLD: 6.8 10^3/UL (ref 1.6–7.5)
NEUTROPHILS NFR BLD AUTO: 69.5 % (ref 39–77)
NRBC # BLD MANUAL: 0 10^3/UL (ref 0–0)
NRBC BLD QL: 0 /100WBC (ref 0–0)
PHOSPHATE SERPL-MCNC: 3.6 MG/DL (ref 2.5–4.9)
PLATELET # BLD: 206 10^3/UL (ref 140–415)
PMV BLD AUTO: 10.1 FL (ref 7.4–10.4)
POTASSIUM SERPL-SCNC: 3.7 MMOL/L (ref 3.5–5.1)
PROT SERPL-MCNC: 5.6 G/DL (ref 6.1–8.1)
RBC # BLD AUTO: 3.03 10^6/UL (ref 4.2–5.4)
SODIUM SERPL-SCNC: 134 MMOL/L (ref 135–144)
WBC # BLD AUTO: 9.8 10^3/UL (ref 4.8–10.8)

## 2017-05-13 RX ADMIN — FAMOTIDINE SCH MG: 20 TABLET ORAL at 08:40

## 2017-05-13 RX ADMIN — Medication SCH CAP: at 20:13

## 2017-05-13 RX ADMIN — LETROZOLE SCH MG: 2.5 TABLET, FILM COATED ORAL at 08:40

## 2017-05-13 RX ADMIN — KETOROLAC TROMETHAMINE PRN MG: 30 INJECTION, SOLUTION INTRAMUSCULAR at 05:51

## 2017-05-13 RX ADMIN — Medication SCH CAP: at 08:40

## 2017-05-13 RX ADMIN — DEXAMETHASONE SODIUM PHOSPHATE PRN MG: 10 INJECTION, SOLUTION INTRAMUSCULAR; INTRAVENOUS at 10:27

## 2017-05-13 RX ADMIN — ENOXAPARIN SODIUM SCH MG: 100 INJECTION SUBCUTANEOUS at 08:41

## 2017-05-13 RX ADMIN — DOCUSATE SODIUM AND SENNOSIDES SCH TAB: 8.6; 5 TABLET, FILM COATED ORAL at 20:13

## 2017-05-13 NOTE — PN
Date/Time of Note


Date/Time of Note


DATE: 5/13/17 


TIME: 14:22





Assessment/Plan


VTE Prophylaxis


VTE Prophylaxis Intervention:  [facility]





Lines/Catheters


IV Catheter Type (from Nrsg):  Saline Lock


Urinary Cath still in place:  Yes


Reason Cath still needed:  pres ulcer contaminated by urine, skin wounds 

contaminated by urine





Assessment/Plan


Chief Complaint/Hosp Course


S- events.


5/12 sat up w PT. Nausea.


5/13: Nausea, abdl pain but below the umbilicus/suprapubic. Better post BM. I 

do not see any itching or fever.





O- vss





PE


No pallor/ adenopathy/icterus


Reg


Ctab


B + nt nd no r/r/g


No edema.  Lt hip C/C/I





A/P


1) Lt hip fracture; sp orif; stable; ttwb/lovenox. dc to rehab.


2) Ftt; snf?


3) Djd; disposition agreeable by Ortho.


4) Htn


5) GERD


6) Rt Ca Breast; sp partial mastectomy and axillary/ ln dissection. 


7) Ho Colon resection.


8) SB


9) Nausea/ probably constipation related. No evidence of upper abd pain icterus 

jaundice fever itching or Terry sign. Will follow as outpt. Dc Lipitor.


Problems:  





Exam/Review of Systems


Vital Signs


Vitals





 Vital Signs








  Date Time  Temp Pulse Resp B/P Pulse Ox O2 Delivery O2 Flow Rate FiO2


 


5/13/17 08:17 97.4 85 20 127/60 94   


 


5/10/17 18:48      Nasal Cannula 2.0 














 Intake and Output   


 


 5/12/17 5/12/17 5/13/17





 15:00 23:00 07:00


 


Intake Total 400 ml 430 ml 320 ml


 


Output Total 700 ml 600 ml 400 ml


 


Balance -300 ml -170 ml -80 ml











Results


Result Diagram:  


5/13/17 0906                                                                   

             5/13/17 0906





Results 24 hrs





Laboratory Tests








Test


  5/13/17


09:06


 


White Blood Count 9.8  


 


Red Blood Count 3.03  L


 


Hemoglobin 9.2  L


 


Hematocrit 28.6  L


 


Mean Corpuscular Volume 94.4  


 


Mean Corpuscular Hemoglobin 30.4  


 


Mean Corpuscular Hemoglobin


Concent 32.2  


 


 


Red Cell Distribution Width 13.7  


 


Platelet Count 206  #


 


Mean Platelet Volume 10.1  


 


Neutrophils % 69.5  


 


Lymphocytes % 21.7  


 


Monocytes % 6.4  


 


Eosinophils % 1.8  


 


Basophils % 0.3  


 


Nucleated Red Blood Cells % 0.0  


 


Neutrophils # 6.8  


 


Lymphocytes # 2.1  


 


Monocytes # 0.6  


 


Eosinophils # 0.2  


 


Basophils # 0.0  


 


Nucleated Red Blood Cells # 0.0  


 


Sodium Level 134  L


 


Potassium Level 3.7  


 


Chloride Level 104  


 


Carbon Dioxide Level 27  


 


Anion Gap 7  L


 


Blood Urea Nitrogen 24  H


 


Creatinine 0.80  


 


Glucose Level 155  


 


Calcium Level 8.1  L


 


Phosphorus Level 3.6  


 


Magnesium Level 2.3  


 


Total Bilirubin 0.5  


 


Direct Bilirubin 0.00  


 


Indirect Bilirubin 0.5  


 


Aspartate Amino Transf


(AST/SGOT) 115  H


 


 


Alanine Aminotransferase


(ALT/SGPT) 126  H


 


 


Alkaline Phosphatase 196  H


 


Total Protein 5.6  L


 


Albumin 3.0  L


 


Globulin 2.60  


 


Albumin/Globulin Ratio 1.15  











Medications


Medications





 Current Medications


Brimonidine Tartrate (Alphagan P 0.1%) 1 drop BID BOTH EYES  Last administered 

on 5/13/17at 08:40; Admin Dose 1 DROP;  Start 5/10/17 at 09:00


Letrozole (Femara) 2.5 mg DAILY PO  Last administered on 5/13/17at 08:40; Admin 

Dose 2.5 MG;  Start 5/10/17 at 09:00


Acetaminophen (Tylenol Tab) 650 mg Q6H  PRN PO PAIN AND OR ELEVATED TEMP;  

Start 5/10/17 at 03:00


Tramadol HCl (Ultram) 50 mg Q6H  PRN PO PAIN;  Start 5/10/17 at 03:06


Hydromorphone HCl (Dilaudid) 1 mg Q4H  PRN IV PAIN;  Start 5/10/17 at 06:00


Famotidine (Pepcid) 20 mg DAILY PO  Last administered on 5/13/17at 08:40; Admin 

Dose 20 MG;  Start 5/12/17 at 09:00


Oxycodone/ Acetaminophen (Endocet (10/ 325)) 1 tab Q4H  PRN PO PAIN Last 

administered on 5/12/17at 08:45; Admin Dose 1 TAB;  Start 5/11/17 at 13:00


Lactobacillus Acidophilus/ Rhamnosus (Culturelle) 1 cap BID PO  Last 

administered on 5/13/17at 08:40; Admin Dose 1 CAP;  Start 5/12/17 at 21:00


Ondansetron HCl (Zofran Inj) 4 mg Q4H  PRN IV NAUSEA AND/OR VOMITING Last 

administered on 5/13/17at 10:27; Admin Dose 4 MG;  Start 5/12/17 at 14:30


Enoxaparin Sodium (Lovenox) 40 mg DAILY SC  Last administered on 5/13/17at 08:41

; Admin Dose 40 MG;  Start 5/12/17 at 14:30


Ketorolac Tromethamine (Toradol) 30 mg Q8  PRN IV PAIN Last administered on 5/13 /17at 05:51; Admin Dose 30 MG;  Start 5/12/17 at 14:30;  Stop 5/15/17 at 14:29


Atorvastatin Calcium (Lipitor) 10 mg DAILY@21 PO ;  Start 5/14/17 at 21:00


Calcium/Vitamin D (Oyster Shell/ Vit-D (500/200)) 1 tab BID PO ;  Start 5/14/17 

at 09:00


Lisinopril (Zestril) 5 mg DAILY PO ;  Start 5/14/17 at 09:00


Senna/Docusate Sodium (Senokot-S) 2 tab HS PO  Last administered on 5/12/17at 20

:18; Admin Dose 2 TAB;  Start 5/12/17 at 21:00


Bisacodyl (Dulcolax Supp) 10 mg Q48H  PRN [place] CONSTIPATION;  Start 5/12/17 at 14:

30


Bisacodyl (Dulcolax) 10 mg DAILY  PRN PO CONSTIPATION Last administered on 5/13/ 17at 04:03; Admin Dose 10 MG;  Start 5/12/17 at 14:30


Prochlorperazine (Compazine) 10 mg TID  PRN PO NAUSEA AND/OR VOMITING Last 

administered on 5/13/17at 08:39; Admin Dose 10 MG;  Start 5/12/17 at 14:30











ALYSSIA KOWALSKI MD May 13, 2017 14:24

## 2017-05-13 NOTE — RADRPT
PROCEDURE:   US Abdomen. 

 

CLINICAL INDICATION:  Right upper quadrant pain

 

TECHNIQUE:   Multiple real-time images were acquired of the patient's abdomen and retroperitoneum ut
ilizing a high resolution transducer. 

 

COMPARISON:   None 

 

FINDINGS:

The pancreas is poorly visualized secondary to overlying bowel gas.  The liver is  diffusely increas
ed in echogenicity.  This most likely represents fatty infiltration of the liver.  

 

The gallbladder surgically absent..   The common bile duct measures 3.3 mm in maximal dimension.  No
 free fluid is identified.

 

The right kidney is unremarkable without evidence of hydronephrosis or mass.  The right kidney measu
res 9.8 cm in length.

 

 

IMPRESSION:

 

1.  The liver is diffusely increased in echogenicity.  This most likely represents fatty infiltratio
n of the liver.

 

 

2.  Status post cholecystectomy.  The common bile duct is normal measuring 3.3 mm in maximal dimensi
on.

 

RPTAT:AAJJ

_____________________________________________ 

Physician Aliyah           Date    Time 

Electronically viewed and signed by Physician Aliyah on 05/13/2017 16:35 

 

D:  05/13/2017 16:35  T:  05/13/2017 16:35

ZAKIA/

## 2017-05-13 NOTE — PDOCDIS
Discharge Instructions


DIAGNOSIS


Discharge Diagnosis:  lt hip fracture





CONDITION


Patient Condition:  Good





HOME CARE INSTRUCTIONS:


Special Diet:  regular





ACTIVITY:








Activity Restrictions:   Slowly Increase Activity





Activity Restrictions Comment:  TTWB





FOLLOW UP/APPOINTMENTS


Appointments


Dr Arce ortho -1wk


PCP 1wk post dc


CMP & CBC w diff Q mon & thrusday.











ALYSSIA KOWALSKI MD May 13, 2017 14:26

## 2017-05-14 VITALS — DIASTOLIC BLOOD PRESSURE: 66 MMHG | SYSTOLIC BLOOD PRESSURE: 132 MMHG | RESPIRATION RATE: 16 BRPM

## 2017-05-14 VITALS — SYSTOLIC BLOOD PRESSURE: 132 MMHG | DIASTOLIC BLOOD PRESSURE: 60 MMHG | RESPIRATION RATE: 18 BRPM

## 2017-05-14 LAB
ADD SCAN DIFF: NO
ALBUMIN SERPL-MCNC: 2.8 G/DL (ref 3.3–4.9)
ALBUMIN/GLOB SERPL: 1.07 {RATIO}
ALP SERPL-CCNC: 160 IU/L (ref 42–121)
ALT SERPL-CCNC: 83 IU/L (ref 13–69)
ANION GAP SERPL CALC-SCNC: 6 MMOL/L (ref 8–16)
APTT BLD: 25.8 SEC (ref 25–35)
AST SERPL-CCNC: 47 IU/L (ref 15–46)
BASOPHILS # BLD AUTO: 0 10^3/UL (ref 0–0.1)
BASOPHILS NFR BLD: 0.2 % (ref 0–2)
BILIRUB DIRECT SERPL-MCNC: 0 MG/DL (ref 0–0.2)
BILIRUB SERPL-MCNC: 0.5 MG/DL (ref 0.2–1.3)
BUN SERPL-MCNC: 21 MG/DL (ref 7–20)
CALCIUM SERPL-MCNC: 7.9 MG/DL (ref 8.4–10.2)
CHLORIDE SERPL-SCNC: 105 MMOL/L (ref 97–110)
CO2 SERPL-SCNC: 28 MMOL/L (ref 21–31)
CREAT SERPL-MCNC: 0.68 MG/DL (ref 0.44–1)
EOSINOPHIL # BLD: 0.3 10^3/UL (ref 0–0.5)
EOSINOPHIL NFR BLD: 2.9 % (ref 0–7)
ERYTHROCYTE [DISTWIDTH] IN BLOOD BY AUTOMATED COUNT: 13.8 % (ref 11.5–14.5)
GLOBULIN SER-MCNC: 2.6 G/DL (ref 1.3–3.2)
GLUCOSE SERPL-MCNC: 118 MG/DL (ref 70–220)
HCT VFR BLD CALC: 28.1 % (ref 37–47)
HGB BLD-MCNC: 8.9 G/DL (ref 12–16)
INR PPP: 1
LYMPHOCYTES # BLD AUTO: 2.6 10^3/UL (ref 0.8–2.9)
LYMPHOCYTES NFR BLD AUTO: 27.5 % (ref 15–51)
MCH RBC QN AUTO: 30.2 PG (ref 29–33)
MCHC RBC AUTO-ENTMCNC: 31.7 G/DL (ref 32–37)
MCV RBC AUTO: 95.3 FL (ref 82–101)
MONOCYTES # BLD: 0.7 10^3/UL (ref 0.3–0.9)
MONOCYTES NFR BLD: 7.4 % (ref 0–11)
NEUTROPHILS # BLD: 5.9 10^3/UL (ref 1.6–7.5)
NEUTROPHILS NFR BLD AUTO: 61.6 % (ref 39–77)
NRBC # BLD MANUAL: 0 10^3/UL (ref 0–0)
NRBC BLD QL: 0 /100WBC (ref 0–0)
PLATELET # BLD: 214 10^3/UL (ref 140–415)
PMV BLD AUTO: 10.1 FL (ref 7.4–10.4)
POTASSIUM SERPL-SCNC: 3.9 MMOL/L (ref 3.5–5.1)
PROT SERPL-MCNC: 5.4 G/DL (ref 6.1–8.1)
PROTHROMBIN TIME: 13.2 SEC (ref 12.2–14.2)
PT RATIO: 1
RBC # BLD AUTO: 2.95 10^6/UL (ref 4.2–5.4)
SODIUM SERPL-SCNC: 135 MMOL/L (ref 135–144)
WBC # BLD AUTO: 9.6 10^3/UL (ref 4.8–10.8)

## 2017-05-14 RX ADMIN — ENOXAPARIN SODIUM SCH MG: 100 INJECTION SUBCUTANEOUS at 09:36

## 2017-05-14 RX ADMIN — DEXAMETHASONE SODIUM PHOSPHATE PRN MG: 10 INJECTION, SOLUTION INTRAMUSCULAR; INTRAVENOUS at 15:51

## 2017-05-14 RX ADMIN — Medication SCH CAP: at 21:02

## 2017-05-14 RX ADMIN — LETROZOLE SCH MG: 2.5 TABLET, FILM COATED ORAL at 11:57

## 2017-05-14 RX ADMIN — KETOROLAC TROMETHAMINE PRN MG: 30 INJECTION, SOLUTION INTRAMUSCULAR at 04:42

## 2017-05-14 RX ADMIN — DOCUSATE SODIUM AND SENNOSIDES SCH TAB: 8.6; 5 TABLET, FILM COATED ORAL at 21:00

## 2017-05-14 RX ADMIN — FAMOTIDINE SCH MG: 20 TABLET ORAL at 11:57

## 2017-05-14 RX ADMIN — DEXAMETHASONE SODIUM PHOSPHATE PRN MG: 10 INJECTION, SOLUTION INTRAMUSCULAR; INTRAVENOUS at 07:57

## 2017-05-14 RX ADMIN — Medication SCH CAP: at 09:00

## 2017-05-14 RX ADMIN — FAMOTIDINE SCH MG: 20 TABLET ORAL at 09:00

## 2017-05-14 RX ADMIN — LISINOPRIL SCH MG: 5 TABLET ORAL at 09:37

## 2017-05-14 RX ADMIN — Medication SCH CAP: at 11:56

## 2017-05-14 RX ADMIN — LETROZOLE SCH MG: 2.5 TABLET, FILM COATED ORAL at 09:00

## 2017-05-14 NOTE — PN
Date/Time of Note


Date/Time of Note


DATE: 5/14/17 


TIME: 13:13





Assessment/Plan


VTE Prophylaxis


VTE Prophylaxis Intervention:  [facility]





Lines/Catheters


IV Catheter Type (from Nrsg):  Saline Lock


Urinary Cath still in place:  Yes


Reason Cath still needed:  skin wounds contaminated by urine





Assessment/Plan


Chief Complaint/Hosp Course


S- events.


5/12 sat up w PT. Nausea.


5/13: Nausea, abdl pain but below the umbilicus/suprapubic. Better post BM. I 

do not see any itching or fever.


5/14: Nausea remains. No abd pain/Itching/fever. LFTs improved. Wound ok.





O- vss





PE


No pallor/icterus


Reg


Ctab


B + nt nd no r/r/g


No edema.  Lt hip C/C/I





A/P


1) Lt hip fracture; sp orif; stable; ttwb/lovenox. DC to rehab.


2) Ftt; snf?


3) Djd; disposition agreeable by Ortho.


4) Htn


5) GERD


6) Rt Ca Breast; sp partial mastectomy and axillary/ ln dissection. 


7) Ho Colon resection.


8) SB


9) Nausea/ probably abn LFTs related. ULT -ve. Statin DC'd. Will follow as 

outpt.


Problems:  





Exam/Review of Systems


Vital Signs


Vitals





 Vital Signs








  Date Time  Temp Pulse Resp B/P Pulse Ox O2 Delivery O2 Flow Rate FiO2


 


5/14/17 07:55 98.7 83 18 132/60 92   


 


5/10/17 18:48      Nasal Cannula 2.0 














 Intake and Output   


 


 5/13/17 5/13/17 5/14/17





 15:00 23:00 07:00


 


Intake Total  600 ml 360 ml


 


Output Total  450 ml 400 ml


 


Balance  150 ml -40 ml











Results


Result Diagram:  


5/14/17 0600                                                                   

             5/14/17 0520





Results 24 hrs





Laboratory Tests








Test


  5/14/17


05:20 5/14/17


06:00


 


Prothrombin Time 13.2   


 


Prothrombin Time Ratio 1.0   


 


INR International Normalized


Ratio 1.00  


  


 


 


Activated Partial


Thromboplast Time 25.8  


  


 


 


Sodium Level 135   


 


Potassium Level 3.9   


 


Chloride Level 105   


 


Carbon Dioxide Level 28   


 


Anion Gap 6  L 


 


Blood Urea Nitrogen 21  H 


 


Creatinine 0.68   


 


Glucose Level 118   


 


Calcium Level 7.9  L 


 


Total Bilirubin 0.5   


 


Direct Bilirubin 0.00   


 


Indirect Bilirubin 0.5   


 


Aspartate Amino Transf


(AST/SGOT) 47  H


  


 


 


Alanine Aminotransferase


(ALT/SGPT) 83  H


  


 


 


Alkaline Phosphatase 160  H 


 


Total Protein 5.4  L 


 


Albumin 2.8  L 


 


Globulin 2.60   


 


Albumin/Globulin Ratio 1.07   


 


Lipase 47   


 


White Blood Count  9.6  


 


Red Blood Count  2.95  L


 


Hemoglobin  8.9  L


 


Hematocrit  28.1  L


 


Mean Corpuscular Volume  95.3  


 


Mean Corpuscular Hemoglobin  30.2  


 


Mean Corpuscular Hemoglobin


Concent 


  31.7  L


 


 


Red Cell Distribution Width  13.8  


 


Platelet Count  214  


 


Mean Platelet Volume  10.1  


 


Neutrophils %  61.6  


 


Lymphocytes %  27.5  


 


Monocytes %  7.4  


 


Eosinophils %  2.9  


 


Basophils %  0.2  


 


Nucleated Red Blood Cells %  0.0  


 


Neutrophils #  5.9  


 


Lymphocytes #  2.6  


 


Monocytes #  0.7  


 


Eosinophils #  0.3  


 


Basophils #  0.0  


 


Nucleated Red Blood Cells #  0.0  











Medications


Medications





 Current Medications


Brimonidine Tartrate (Alphagan P 0.1%) 1 drop BID BOTH EYES  Last administered 

on 5/14/17at 09:35; Admin Dose 1 DROP;  Start 5/10/17 at 09:00


Letrozole (Femara) 2.5 mg DAILY PO  Last administered on 5/14/17at 11:57; Admin 

Dose 2.5 MG;  Start 5/10/17 at 09:00


Acetaminophen (Tylenol Tab) 650 mg Q6H  PRN PO PAIN AND OR ELEVATED TEMP;  

Start 5/10/17 at 03:00


Tramadol HCl (Ultram) 50 mg Q6H  PRN PO PAIN;  Start 5/10/17 at 03:06


Hydromorphone HCl (Dilaudid) 1 mg Q4H  PRN IV PAIN;  Start 5/10/17 at 06:00


Famotidine (Pepcid) 20 mg DAILY PO  Last administered on 5/14/17at 11:57; Admin 

Dose 20 MG;  Start 5/12/17 at 09:00


Oxycodone/ Acetaminophen (Endocet (10/ 325)) 1 tab Q4H  PRN PO PAIN Last 

administered on 5/12/17at 08:45; Admin Dose 1 TAB;  Start 5/11/17 at 13:00


Lactobacillus Acidophilus/ Rhamnosus (Culturelle) 1 cap BID PO  Last 

administered on 5/14/17at 11:56; Admin Dose 1 CAP;  Start 5/12/17 at 21:00


Ondansetron HCl (Zofran Inj) 4 mg Q4H  PRN IV NAUSEA AND/OR VOMITING Last 

administered on 5/14/17at 07:57; Admin Dose 4 MG;  Start 5/12/17 at 14:30


Enoxaparin Sodium (Lovenox) 40 mg DAILY SC  Last administered on 5/14/17at 09:36

; Admin Dose 40 MG;  Start 5/12/17 at 14:30


Ketorolac Tromethamine (Toradol) 30 mg Q8  PRN IV PAIN Last administered on 5/14 /17at 04:42; Admin Dose 30 MG;  Start 5/12/17 at 14:30;  Stop 5/15/17 at 14:29


Calcium/Vitamin D (Oyster Shell/ Vit-D (500/200)) 1 tab BID PO ;  Start 5/14/17 

at 09:00


Lisinopril (Zestril) 5 mg DAILY PO  Last administered on 5/14/17at 09:37; Admin 

Dose 5 MG;  Start 5/14/17 at 09:00


Senna/Docusate Sodium (Senokot-S) 2 tab HS PO  Last administered on 5/12/17at 20

:18; Admin Dose 2 TAB;  Start 5/12/17 at 21:00


Bisacodyl (Dulcolax Supp) 10 mg Q48H  PRN [place] CONSTIPATION;  Start 5/12/17 at 14:

30


Bisacodyl (Dulcolax) 10 mg DAILY  PRN PO CONSTIPATION Last administered on 5/13/ 17at 04:03; Admin Dose 10 MG;  Start 5/12/17 at 14:30


Prochlorperazine (Compazine) 10 mg TID  PRN PO NAUSEA AND/OR VOMITING Last 

administered on 5/13/17at 08:39; Admin Dose 10 MG;  Start 5/12/17 at 14:30











ALYSSIA KOWALSKI MD May 14, 2017 13:15

## 2017-05-14 NOTE — DS
DATE OF ADMISSION: 05/09/2017

DATE OF DISCHARGE: 05/13/2017

 

PRIMARY CARE PHYSICIAN:  Unknown.

 

CONSULTANT:  Dr. Arce

 

DIAGNOSIS ON ADMISSION:  Left hip fracture.

 

DIAGNOSES ON DISCHARGE:

1.  Left hip fracture.

2.  Deconditioning.

3.  Abnormal liver function tests.

4.  Chronic dyslipidemia. 

5.  Chronic hypertension.

6.  Degenerative joint disease.

7.  Probably metabolic syndrome.

8.  Glaucoma on Alphagan.

 

HOSPITAL COURSE:  A 77-year-old female admitted with hip fracture seen by orthopedic surgery and und
erwent ORIF.  Please see operative report for complete details.  Postoperative hospital course was u
ncomplicated.  The patient tolerating diet.  Pain is controlled, and she is stable and fit for disch
arge.  She is on Lovenox for DVT prophylaxis, and she is toe touch weightbearing at this time.  Post
operatively, the patient did have nausea and vomiting, I think related to constipation or abnormal L
FTs.  We have discontinued Lipitor.  She has no Terry sign, icterus, jaundice, fever, etc.  Therefo
re, I do not believe this is an active gallbladder; however, we will keep an eye on this as an outpa
tient.  Of note, the patient has chronic breast cancer status post mastectomy and lymph node dissect
ion on metronidazole as well.

 

DISCHARGE PLAN:  The patient will be discharged to skilled nursing facility or acute rehabilitation.

 

DIET:  Low salt and cholesterol.  

 

ACTIVITY:  Toe touch weightbearing

 

CODE STATUS:  FULL.

 

CONDITION:  Good.

 

BARRIERS TO DISCHARGE:  None.

 

PENDING TESTS:  None.

 

FUNCTIONAL STATUS:  The patient is awake, alert, agrees with the current plan of care.

 

REASON FOR ADMISSION:  Hip fracture.

 

ALLERGIES:

1.  VICODIN

2.  CODEINE.

3.  MORPHINE which did not cause rash or dyspnea but only nausea.

 

STOPPED MEDICATIONS:

1.  Lipitor.

2.  Loratadine.

3.  Meloxicam.

 

CONTINUED MEDICATIONS:

1.  Lisinopril 5 daily.

2.  Os-Daniel as before.

3.  Alphagan eyedrops as before.

 

ALTERED MEDICATIONS:  None.

 

NEW MEDICATIONS:

1.  Senna-S 2 tablets daily.

2.  Lovenox 40 daily.

3.  Motrin 600 every 6 hours as needed.

4.  Percocet 10/325, one every 4 to 8 hours as needed.

5.  Culturelle 1 capsule twice daily.

6.  Pepcid 20 daily.

7.  Dulcolax as needed.

8.  Tylenol as needed.

 

IMAGING STUDIES:  Chest x-ray:  No acute process.  Postoperative hip x-rays showed intact and good a
lignment.

 

LABORATORY DATA:  CMP unremarkable except AST and ALT of 115 and 126 with an alkaline phosphatase of
 196, bilirubin 0.5, albumin 3, protein at 5.6, which is all fairly new.  On admission, these were n
ormal.  TSH is 0.369.  Free T4 1.7.  Total T3 0.9.  Iron of 80, binding capacity of 250, percent sat
 of 33, ferritin 76.  INR 0.9.  White cell count of 9, hemoglobin and hematocrit of 9 and 28, MCV 94
, platelets of 206.

 

 

Dictated By: ALYSSIA KOWALSKI MD

 

AC/NTS

DD:    05/13/2017 14:38:03

DT:    05/14/2017 03:15:49

Conf#: [id number]

DID#:  [id number]

CC: TERRY ARCE MD;*EndCC*

## 2017-05-15 VITALS — DIASTOLIC BLOOD PRESSURE: 64 MMHG | RESPIRATION RATE: 16 BRPM | SYSTOLIC BLOOD PRESSURE: 128 MMHG

## 2017-05-15 VITALS — RESPIRATION RATE: 19 BRPM | DIASTOLIC BLOOD PRESSURE: 63 MMHG | SYSTOLIC BLOOD PRESSURE: 137 MMHG

## 2017-05-15 RX ADMIN — LETROZOLE SCH MG: 2.5 TABLET, FILM COATED ORAL at 08:37

## 2017-05-15 RX ADMIN — DEXAMETHASONE SODIUM PHOSPHATE PRN MG: 10 INJECTION, SOLUTION INTRAMUSCULAR; INTRAVENOUS at 08:35

## 2017-05-15 RX ADMIN — Medication SCH CAP: at 21:48

## 2017-05-15 RX ADMIN — Medication SCH CAP: at 08:36

## 2017-05-15 RX ADMIN — DOCUSATE SODIUM AND SENNOSIDES SCH TAB: 8.6; 5 TABLET, FILM COATED ORAL at 21:48

## 2017-05-15 RX ADMIN — FAMOTIDINE SCH MG: 20 TABLET ORAL at 08:40

## 2017-05-15 RX ADMIN — ENOXAPARIN SODIUM SCH MG: 100 INJECTION SUBCUTANEOUS at 08:37

## 2017-05-15 RX ADMIN — LISINOPRIL SCH MG: 5 TABLET ORAL at 08:36

## 2017-05-15 NOTE — PN
Date/Time of Note


Date/Time of Note


DATE: 5/15/17 


TIME: 14:59





Assessment/Plan


VTE Prophylaxis


VTE Prophylaxis Intervention:  [facility]





Lines/Catheters


IV Catheter Type (from Nrs):  Saline Lock


Urinary Cath still in place:  Yes


Reason Cath still needed:  other (indicate)





Assessment/Plan


Assessment/Plan


1.  Left hip fracture, s/p ORIF, stable, follow up with ortho/PT


2.  Abnormal liver function tests, probably statin related, improving after 

lipitor is held


3.  Dyslipidemia. statin is on hold


4.  Hypertension.stable


5.  Degenerative joint disease.


6.  Glaucoma on Alphagan.


7.  DVT prophylaxis: lovenox





Subjective


24 Hr Interval Summary


Free Text/Dictation


left hip pain





Exam/Review of Systems


Vital Signs


Vitals





 Vital Signs








  Date Time  Temp Pulse Resp B/P Pulse Ox O2 Delivery O2 Flow Rate FiO2


 


5/15/17 07:35 98.3 81 16 128/64 93   














 Intake and Output   


 


 5/14/17 5/14/17 5/15/17





 15:00 23:00 07:00


 


Intake Total  670 ml 240 ml


 


Output Total  700 ml 800 ml


 


Balance  -30 ml -560 ml











Exam


Constitutional:  alert, oriented, well developed


Psych:  no complaints


Head:  atraumatic, normocephalic


Eyes:  EOMI, PERRL, nl conjunctiva, nl lids, nl sclera


ENMT:  nl external ears & nose, nl lips & teeth, nl nasal mucosa & septum


Neck:  non-tender, supple


Respiratory:  clear to auscultation, normal air movement, 


   No congested cough, No crackles/rales, No diminished breath sounds, No 

intercostal retraction, No labored breathing, No other, No respirations, No 

tactile fremitus, No wheezing


Cardiovascular:  nl pulses, regular rate and rhythm, 


   No S3, No S4, No bruits, No diastolic murmur, No edema, No gallop, No 

irregular rhythm, No jugular venous distention (JVD), No murmurs/extra sounds, 

No other, No rub, No systolic murmur


Gastrointestinal:  nl liver, spleen, non-tender, soft, 


   No ascites, No bowel sounds, No distended, No firm, No hepatomegaly, No mass

, No other, No rebound or guarding, No splenomegaly, No surgical scars, No 

tender


Extremities:  normal pulses, other (left hip without hematoma)


Neurological:  CNS II-XII intact, nl mental status, nl speech, nl strength





Results


Result Diagram:  


5/14/17 0600                                                                   

             5/14/17 0520








Medications


Medications





 Current Medications


Brimonidine Tartrate (Alphagan P 0.1%) 1 drop BID BOTH EYES  Last administered 

on 5/15/17at 08:38; Admin Dose 1 DROP;  Start 5/10/17 at 09:00


Letrozole (Femara) 2.5 mg DAILY PO  Last administered on 5/15/17at 08:37; Admin 

Dose 2.5 MG;  Start 5/10/17 at 09:00


Acetaminophen (Tylenol Tab) 650 mg Q6H  PRN PO PAIN AND OR ELEVATED TEMP;  

Start 5/10/17 at 03:00


Tramadol HCl (Ultram) 50 mg Q6H  PRN PO PAIN;  Start 5/10/17 at 03:06


Hydromorphone HCl (Dilaudid) 1 mg Q4H  PRN IV PAIN;  Start 5/10/17 at 06:00


Famotidine (Pepcid) 20 mg DAILY PO  Last administered on 5/15/17at 08:40; Admin 

Dose 20 MG;  Start 5/12/17 at 09:00


Oxycodone/ Acetaminophen (Endocet (10/ 325)) 1 tab Q4H  PRN PO PAIN Last 

administered on 5/12/17at 08:45; Admin Dose 1 TAB;  Start 5/11/17 at 13:00


Lactobacillus Acidophilus/ Rhamnosus (Culturelle) 1 cap BID PO  Last 

administered on 5/15/17at 08:36; Admin Dose 1 CAP;  Start 5/12/17 at 21:00


Ondansetron HCl (Zofran Inj) 4 mg Q4H  PRN IV NAUSEA AND/OR VOMITING Last 

administered on 5/15/17at 08:35; Admin Dose 4 MG;  Start 5/12/17 at 14:30


Enoxaparin Sodium (Lovenox) 40 mg DAILY SC  Last administered on 5/15/17at 08:37

; Admin Dose 40 MG;  Start 5/12/17 at 14:30


Calcium/Vitamin D (Oyster Shell/ Vit-D (500/200)) 1 tab BID PO  Last 

administered on 5/15/17at 08:35; Admin Dose 1 TAB;  Start 5/14/17 at 09:00


Lisinopril (Zestril) 5 mg DAILY PO  Last administered on 5/15/17at 08:36; Admin 

Dose 5 MG;  Start 5/14/17 at 09:00


Senna/Docusate Sodium (Senokot-S) 2 tab HS PO  Last administered on 5/12/17at 20

:18; Admin Dose 2 TAB;  Start 5/12/17 at 21:00


Bisacodyl (Dulcolax Supp) 10 mg Q48H  PRN [place] CONSTIPATION;  Start 5/12/17 at 14:

30


Bisacodyl (Dulcolax) 10 mg DAILY  PRN PO CONSTIPATION Last administered on 5/13/ 17at 04:03; Admin Dose 10 MG;  Start 5/12/17 at 14:30


Prochlorperazine (Compazine) 10 mg TID  PRN PO NAUSEA AND/OR VOMITING Last 

administered on 5/13/17at 08:39; Admin Dose 10 MG;  Start 5/12/17 at 14:30











YOUNG JETT MD May 15, 2017 15:06

## 2017-05-16 VITALS — RESPIRATION RATE: 18 BRPM | DIASTOLIC BLOOD PRESSURE: 60 MMHG | SYSTOLIC BLOOD PRESSURE: 125 MMHG

## 2017-05-16 VITALS — SYSTOLIC BLOOD PRESSURE: 112 MMHG | DIASTOLIC BLOOD PRESSURE: 73 MMHG | RESPIRATION RATE: 16 BRPM

## 2017-05-16 LAB
ADD SCAN DIFF: NO
ADD UMIC: YES
ALBUMIN SERPL-MCNC: 3 G/DL (ref 3.3–4.9)
ALBUMIN/GLOB SERPL: 1.11 {RATIO}
ALP SERPL-CCNC: 128 IU/L (ref 42–121)
ALT SERPL-CCNC: 49 IU/L (ref 13–69)
ANION GAP SERPL CALC-SCNC: 8 MMOL/L (ref 8–16)
AST SERPL-CCNC: 20 IU/L (ref 15–46)
BACTERIA #/AREA URNS HPF: (no result) /[HPF]
BASOPHILS # BLD AUTO: 0 10^3/UL (ref 0–0.1)
BASOPHILS NFR BLD: 0.1 % (ref 0–2)
BILIRUB DIRECT SERPL-MCNC: 0 MG/DL (ref 0–0.2)
BILIRUB SERPL-MCNC: 0.7 MG/DL (ref 0.2–1.3)
BUN SERPL-MCNC: 14 MG/DL (ref 7–20)
CALCIUM SERPL-MCNC: 8.5 MG/DL (ref 8.4–10.2)
CHLORIDE SERPL-SCNC: 103 MMOL/L (ref 97–110)
CO2 SERPL-SCNC: 28 MMOL/L (ref 21–31)
COLOR UR: (no result)
CREAT SERPL-MCNC: 0.66 MG/DL (ref 0.44–1)
EOSINOPHIL # BLD: 0.3 10^3/UL (ref 0–0.5)
EOSINOPHIL NFR BLD: 2.3 % (ref 0–7)
ERYTHROCYTE [DISTWIDTH] IN BLOOD BY AUTOMATED COUNT: 14.4 % (ref 11.5–14.5)
GLOBULIN SER-MCNC: 2.7 G/DL (ref 1.3–3.2)
GLUCOSE SERPL-MCNC: 118 MG/DL (ref 70–220)
GLUCOSE UR STRIP-MCNC: NEGATIVE %
HCT VFR BLD CALC: 29.7 % (ref 37–47)
HGB BLD-MCNC: 9.3 G/DL (ref 12–16)
KETONES UR STRIP.AUTO-MCNC: NEGATIVE MG/DL
LYMPHOCYTES # BLD AUTO: 2.8 10^3/UL (ref 0.8–2.9)
LYMPHOCYTES NFR BLD AUTO: 20.1 % (ref 15–51)
MCH RBC QN AUTO: 30.1 PG (ref 29–33)
MCHC RBC AUTO-ENTMCNC: 31.3 G/DL (ref 32–37)
MCV RBC AUTO: 96.1 FL (ref 82–101)
MONOCYTES # BLD: 0.9 10^3/UL (ref 0.3–0.9)
MONOCYTES NFR BLD: 6.3 % (ref 0–11)
NEUTROPHILS # BLD: 9.7 10^3/UL (ref 1.6–7.5)
NEUTROPHILS NFR BLD AUTO: 70.8 % (ref 39–77)
NITRITE UR QL STRIP.AUTO: POSITIVE
NRBC # BLD MANUAL: 0 10^3/UL (ref 0–0)
NRBC BLD QL: 0 /100WBC (ref 0–0)
PLATELET # BLD: 312 10^3/UL (ref 140–415)
PMV BLD AUTO: 9.8 FL (ref 7.4–10.4)
POTASSIUM SERPL-SCNC: 4.4 MMOL/L (ref 3.5–5.1)
PROT SERPL-MCNC: 5.7 G/DL (ref 6.1–8.1)
RBC # BLD AUTO: 3.09 10^6/UL (ref 4.2–5.4)
RBC # UR AUTO: (no result) /UL
RBC #/AREA URNS HPF: (no result) /HPF
SODIUM SERPL-SCNC: 135 MMOL/L (ref 135–144)
SQUAMOUS #/AREA URNS HPF: (no result) /[HPF]
URINE BILIRUBIN (DIP): NEGATIVE
URINE TOTAL PROTEIN (DIP): NEGATIVE
UROBILINOGEN UR STRIP-ACNC: (no result) (ref 0.1–1)
WBC # BLD AUTO: 13.8 10^3/UL (ref 4.8–10.8)
WBC # UR STRIP: (no result) /UL

## 2017-05-16 RX ADMIN — DOCUSATE SODIUM AND SENNOSIDES SCH TAB: 8.6; 5 TABLET, FILM COATED ORAL at 21:39

## 2017-05-16 RX ADMIN — LISINOPRIL SCH MG: 5 TABLET ORAL at 08:16

## 2017-05-16 RX ADMIN — LETROZOLE SCH MG: 2.5 TABLET, FILM COATED ORAL at 08:18

## 2017-05-16 RX ADMIN — Medication SCH CAP: at 21:39

## 2017-05-16 RX ADMIN — Medication SCH CAP: at 08:16

## 2017-05-16 RX ADMIN — ENOXAPARIN SODIUM SCH MG: 100 INJECTION SUBCUTANEOUS at 08:18

## 2017-05-16 RX ADMIN — FAMOTIDINE SCH MG: 20 TABLET ORAL at 08:16

## 2017-05-16 NOTE — PN
Date/Time of Note


Date/Time of Note


DATE: 5/16/17 


TIME: 15:22





Assessment/Plan


VTE Prophylaxis


VTE Prophylaxis Intervention:  LMWH





Lines/Catheters


IV Catheter Type (from Nrs):  Saline Lock


Urinary Cath still in place:  Yes


Reason Cath still needed:  other (indicate)





Assessment/Plan


Assessment/Plan


1.  Left hip fracture, s/p ORIF on 6/11/2017, stable, follow up with ortho/PT


2.  Abnormal liver function tests, probably statin related, improving after 

lipitor is held


3.  Dyslipidemia. statin is on hold


4.  Hypertension.stable


5.  Degenerative joint disease.


6.  Glaucoma on Alphagan.


7.  DVT prophylaxis: lovenox


8. Awaiting for placement


9. Leukocytosis, check UA





Subjective


24 Hr Interval Summary


Free Text/Dictation


left hip pain. afebrile





Exam/Review of Systems


Vital Signs


Vitals





 Vital Signs








  Date Time  Temp Pulse Resp B/P Pulse Ox O2 Delivery O2 Flow Rate FiO2


 


5/16/17 07:34 98.5 60 18 125/60 94   














 Intake and Output   


 


 5/15/17 5/15/17 5/16/17





 15:00 23:00 07:00


 


Intake Total  830 ml 480 ml


 


Output Total  700 ml 800 ml


 


Balance  130 ml -320 ml











Exam


Constitutional:  alert, oriented, well developed


Psych:  nl mood/affect, no complaints


Head:  atraumatic, normocephalic


Eyes:  EOMI, PERRL, nl conjunctiva, nl lids


ENMT:  mucosa pink and moist, nl external ears & nose, nl lips & teeth, nl 

nasal mucosa & septum


Neck:  non-tender, supple


Respiratory:  clear to auscultation, normal air movement, 


   No congested cough, No crackles/rales, No diminished breath sounds, No 

intercostal retraction, No labored breathing, No other, No respirations, No 

tactile fremitus, No wheezing


Cardiovascular:  nl pulses, regular rate and rhythm, 


   No S3, No S4, No bruits, No diastolic murmur, No edema, No gallop, No 

irregular rhythm, No jugular venous distention (JVD), No murmurs/extra sounds, 

No other, No rub, No systolic murmur


Gastrointestinal:  nl liver, spleen, non-tender, soft, 


   No ascites, No bowel sounds, No distended, No firm, No hepatomegaly, No mass

, No other, No rebound or guarding, No splenomegaly, No surgical scars, No 

tender


Musculoskeletal:  nl extremities to inspection, other (left hip no hematoma, no 

bleeding)


Extremities:  normal pulses, tenderness (left hipo pain), 


   No calf tenderness, No clubbing, No cyanosis, No edema, No other, No 

palpable cord, No pitting pedal edema


Neurological:  CNS II-XII intact, nl mental status, nl speech, nl strength


Skin:  nl turgor


Lymph:  nl lymph nodes





Results


Result Diagram:  


5/16/17 0515                                                                   

             5/16/17 0515





Results 24 hrs





Laboratory Tests








Test


  5/16/17


05:15


 


White Blood Count 13.8  #H


 


Red Blood Count 3.09  L


 


Hemoglobin 9.3  L


 


Hematocrit 29.7  L


 


Mean Corpuscular Volume 96.1  


 


Mean Corpuscular Hemoglobin 30.1  


 


Mean Corpuscular Hemoglobin


Concent 31.3  L


 


 


Red Cell Distribution Width 14.4  


 


Platelet Count 312  #


 


Mean Platelet Volume 9.8  


 


Neutrophils % 70.8  


 


Lymphocytes % 20.1  


 


Monocytes % 6.3  


 


Eosinophils % 2.3  


 


Basophils % 0.1  


 


Nucleated Red Blood Cells % 0.0  


 


Neutrophils # 9.7  H


 


Lymphocytes # 2.8  


 


Monocytes # 0.9  


 


Eosinophils # 0.3  


 


Basophils # 0.0  


 


Nucleated Red Blood Cells # 0.0  


 


Sodium Level 135  


 


Potassium Level 4.4  


 


Chloride Level 103  


 


Carbon Dioxide Level 28  


 


Anion Gap 8  


 


Blood Urea Nitrogen 14  


 


Creatinine 0.66  


 


Glucose Level 118  


 


Calcium Level 8.5  


 


Total Bilirubin 0.7  


 


Direct Bilirubin 0.00  


 


Indirect Bilirubin 0.7  


 


Aspartate Amino Transf


(AST/SGOT) 20  


 


 


Alanine Aminotransferase


(ALT/SGPT) 49  


 


 


Alkaline Phosphatase 128  H


 


Total Protein 5.7  L


 


Albumin 3.0  L


 


Globulin 2.70  


 


Albumin/Globulin Ratio 1.11  











Medications


Medications





 Current Medications


Brimonidine Tartrate (Alphagan P 0.1%) 1 drop BID BOTH EYES  Last administered 

on 5/16/17at 08:16; Admin Dose 1 DROP;  Start 5/10/17 at 09:00


Letrozole (Femara) 2.5 mg DAILY PO  Last administered on 5/16/17at 08:18; Admin 

Dose 2.5 MG;  Start 5/10/17 at 09:00


Acetaminophen (Tylenol Tab) 650 mg Q6H  PRN PO PAIN AND OR ELEVATED TEMP;  

Start 5/10/17 at 03:00


Tramadol HCl (Ultram) 50 mg Q6H  PRN PO PAIN;  Start 5/10/17 at 03:06


Hydromorphone HCl (Dilaudid) 1 mg Q4H  PRN IV PAIN;  Start 5/10/17 at 06:00


Famotidine (Pepcid) 20 mg DAILY PO  Last administered on 5/16/17at 08:16; Admin 

Dose 20 MG;  Start 5/12/17 at 09:00


Oxycodone/ Acetaminophen (Endocet (10/ 325)) 1 tab Q4H  PRN PO PAIN Last 

administered on 5/12/17at 08:45; Admin Dose 1 TAB;  Start 5/11/17 at 13:00


Lactobacillus Acidophilus/ Rhamnosus (Culturelle) 1 cap BID PO  Last 

administered on 5/16/17at 08:16; Admin Dose 1 CAP;  Start 5/12/17 at 21:00


Ondansetron HCl (Zofran Inj) 4 mg Q4H  PRN IV NAUSEA AND/OR VOMITING Last 

administered on 5/15/17at 08:35; Admin Dose 4 MG;  Start 5/12/17 at 14:30


Enoxaparin Sodium (Lovenox) 40 mg DAILY SC  Last administered on 5/16/17at 08:18

; Admin Dose 40 MG;  Start 5/12/17 at 14:30


Calcium/Vitamin D (Oyster Shell/ Vit-D (500/200)) 1 tab BID PO  Last 

administered on 5/16/17at 08:16; Admin Dose 1 TAB;  Start 5/14/17 at 09:00


Lisinopril (Zestril) 5 mg DAILY PO  Last administered on 5/16/17at 08:16; Admin 

Dose 5 MG;  Start 5/14/17 at 09:00


Senna/Docusate Sodium (Senokot-S) 2 tab HS PO  Last administered on 5/15/17at 21

:48; Admin Dose 2 TAB;  Start 5/12/17 at 21:00


Bisacodyl (Dulcolax Supp) 10 mg Q48H  PRN SD CONSTIPATION;  Start 5/12/17 at 14:

30


Bisacodyl (Dulcolax) 10 mg DAILY  PRN PO CONSTIPATION Last administered on 5/13/ 17at 04:03; Admin Dose 10 MG;  Start 5/12/17 at 14:30


Prochlorperazine (Compazine) 10 mg TID  PRN PO NAUSEA AND/OR VOMITING Last 

administered on 5/16/17at 10:34; Admin Dose 10 MG;  Start 5/12/17 at 14:30











YOUNG JETT MD May 16, 2017 15:26

## 2017-05-17 VITALS — RESPIRATION RATE: 20 BRPM | SYSTOLIC BLOOD PRESSURE: 115 MMHG | DIASTOLIC BLOOD PRESSURE: 56 MMHG

## 2017-05-17 VITALS — RESPIRATION RATE: 16 BRPM | SYSTOLIC BLOOD PRESSURE: 131 MMHG | DIASTOLIC BLOOD PRESSURE: 58 MMHG

## 2017-05-17 LAB
ADD SCAN DIFF: NO
BASOPHILS # BLD AUTO: 0 10^3/UL (ref 0–0.1)
BASOPHILS NFR BLD: 0.2 % (ref 0–2)
EOSINOPHIL # BLD: 0.4 10^3/UL (ref 0–0.5)
EOSINOPHIL NFR BLD: 3.3 % (ref 0–7)
ERYTHROCYTE [DISTWIDTH] IN BLOOD BY AUTOMATED COUNT: 14.6 % (ref 11.5–14.5)
HCT VFR BLD CALC: 31.4 % (ref 37–47)
HGB BLD-MCNC: 9.8 G/DL (ref 12–16)
LYMPHOCYTES # BLD AUTO: 2.5 10^3/UL (ref 0.8–2.9)
LYMPHOCYTES NFR BLD AUTO: 19.5 % (ref 15–51)
MCH RBC QN AUTO: 30 PG (ref 29–33)
MCHC RBC AUTO-ENTMCNC: 31.2 G/DL (ref 32–37)
MCV RBC AUTO: 96 FL (ref 82–101)
MONOCYTES # BLD: 0.8 10^3/UL (ref 0.3–0.9)
MONOCYTES NFR BLD: 6.6 % (ref 0–11)
NEUTROPHILS # BLD: 8.8 10^3/UL (ref 1.6–7.5)
NEUTROPHILS NFR BLD AUTO: 69.8 % (ref 39–77)
NRBC # BLD MANUAL: 0 10^3/UL (ref 0–0)
NRBC BLD QL: 0 /100WBC (ref 0–0)
PLATELET # BLD: 347 10^3/UL (ref 140–415)
PMV BLD AUTO: 9.9 FL (ref 7.4–10.4)
RBC # BLD AUTO: 3.27 10^6/UL (ref 4.2–5.4)
WBC # BLD AUTO: 12.6 10^3/UL (ref 4.8–10.8)

## 2017-05-17 RX ADMIN — LETROZOLE SCH MG: 2.5 TABLET, FILM COATED ORAL at 09:10

## 2017-05-17 RX ADMIN — LISINOPRIL SCH MG: 5 TABLET ORAL at 09:09

## 2017-05-17 RX ADMIN — ENOXAPARIN SODIUM SCH MG: 100 INJECTION SUBCUTANEOUS at 09:11

## 2017-05-17 RX ADMIN — Medication SCH CAP: at 09:08

## 2017-05-17 RX ADMIN — FAMOTIDINE SCH MG: 20 TABLET ORAL at 09:08

## 2017-05-17 NOTE — DS
Date/Time of Note


Date/Time of Note


DATE: 5/17/17 


TIME: 14:39





Discharge Summary


Admission/Discharge Info


Admit Date/Time


May 9, 2017 at 22:49


Discharge Date/Time





Final Diagnosis


1.  Left hip fracture, s/p ORIF on 5/11/2017, stable, follow up with ortho/PT


2.  Abnormal liver function tests, improved after lipitor is held


3.  Dyslipidemia. statin is on hold, follow up with PCP


4.  Hypertension.stable


5.  Degenerative joint disease.


6.  Glaucoma on Alphagan.


7.  UTI, levaquin


Patient Condition:  Stable


Hospital Course


he patient is a 77-year-old female with a history of hypertension, GERD, 

arthritis, right breast carcinoma status post partial mastectomy and axillary 

dissection who presents to the emergency department complaining of left hip 

pain after she fell down.  The patient bent down to  with her keys when 

she accidentally fell, landing on her left hip.  She immediately started 

feeling pain on her left hip, and she was not able to get herself up.  Denied 

hitting her head or loss of consciousness.  She also denied any chest pain, 

shortness of breath, palpitation, lightheadedness.





X-ray indicated left hip fracture. Patient had ORIF for left hip on 5/11/2017 

without complications. Patient is discharged to SNF for physical therapy.





Patient has leukocytosis 13,800, UA with WBC 12-25. Patient is started on 

levaquin on 5/17/2017.


Home Meds


Active Scripts


Levofloxacin* (Levaquin*) 250 Mg Tablet, 250 MG PO DAILY for 7 Days, TAB


   Prov:YOUNG JETT MD         5/17/17


Enoxaparin Sodium (Enoxaparin Sodium) 40 Mg/0.4 Ml Syringe, 40 MG SC DAILY for 

30 Days, #30


   Prov:ALYSSIA KOWALSKI MD         5/13/17


Reported Medications


Brimonidine Tartrate* (Alphagan P*) 0.1%-15 Ml Opht Drops, 1 DROP BOTH EYES BID

, #1 EA


   5/9/17


Letrozole* (Letrozole*) 2.5 Mg Tablet, 2.5 MG PO DAILY, TAB


   5/9/17


Meloxicam* (Meloxicam*) 7.5 Mg Tablet, 7.5 MG PO DAILY, #30 TAB


   5/9/17


Lisinopril* (Lisinopril*) 5 Mg Tablet, 5 MG PO DAILY, #30 TAB


   5/9/17


Calcium Carb/Vit D3/Minerals (CALCIUM 600 + D TABLET) 1 Each Tablet, 1 EACH PO 

BID


   6/7/13


Discontinued Reported Medications


Atorvastatin Calcium (Atorvastatin Calcium) 10 Mg Tab, 10 MG PO DAILY


   6/7/13


Loratadine* (Loratadine*) 10 Mg Tablet, 10 MG PO DAILY Y


   6/7/13


Follow-up Plan


PCP in one week


Carmen Arce in one week


Primary Care Provider


Not On Staff Doctor


Pending Labs





Laboratory Tests








Test


  5/16/17


17:00 5/17/17


04:15


 


Urine Color


  LT. YELLOW


(YELLOW) 


 


 


Urine Clarity CLEAR (CLEAR)  


 


Urine pH 6.5 (5.0-9.0)  


 


Urine Specific Gravity


  <=1.005


(1.003-1.030) 


 


 


Urine Ketones


  NEGATIVE


(NEGATIVE) 


 


 


Urine Nitrite


  POSITIVE


(NEGATIVE) 


 


 


Urine Bilirubin


  NEGATIVE


(NEGATIVE) 


 


 


Urine Urobilinogen


  0.2  E.U./dL


(0.1-1.0) 


 


 


Urine Leukocyte Esterase 3+ (NEGATIVE)  


 


Urine Microscopic RBC 0-2/HPF (0)  


 


Urine Microscopic WBC 10-25/HPF (0)  


 


Urine Squamous Epithelial


Cells FEW 


  


 


 


Urine Bacteria MANY  


 


Urine Hemoglobin


  TRACE


(NEGATIVE) 


 


 


Urine Glucose


  NEGATIVE%


(NEGATIVE) 


 


 


Urine Total Protein


  NEGATIVE


(NEGATIVE) 


 


 


White Blood Count


  


  12.610^3/ul


(4.8-10.8)


 


Red Blood Count


  


  3.2710^6/ul


(4.20-5.40)


 


Hemoglobin


  


  9.8g/dl


(12.0-16.0)


 


Hematocrit


  


  31.4%


(37.0-47.0)


 


Mean Corpuscular Volume


  


  96.0fl


(82.0-101.0)


 


Mean Corpuscular Hemoglobin


  


  30.0pg


(29.0-33.0)


 


Mean Corpuscular Hemoglobin


Concent 


  31.2g/dl


(32.0-37.0)


 


Red Cell Distribution Width


  


  14.6%


(11.5-14.5)


 


Platelet Count


  


  87432^3/UL


(140-415)


 


Mean Platelet Volume


  


  9.9fl


(7.4-10.4)


 


Neutrophils %


  


  69.8%


(39.0-77.0)


 


Lymphocytes %


  


  19.5%


(15.0-51.0)


 


Monocytes %


  


  6.6%


(0.0-11.0)


 


Eosinophils %  3.3% (0.0-7.0) 


 


Basophils %  0.2% (0.0-2.0) 


 


Nucleated Red Blood Cells %


  


  0.0/100WBC


(0.0-0.0)


 


Neutrophils #


  


  8.810^3/ul


(1.6-7.5)


 


Lymphocytes #


  


  2.510^3/ul


(0.8-2.9)


 


Monocytes #


  


  0.810^3/ul


(0.3-0.9)


 


Eosinophils #


  


  0.410^3/ul


(0.0-0.5)


 


Basophils #


  


  0.010^3/ul


(0.0-0.1)


 


Nucleated Red Blood Cells #


  


  0.010^3/ul


(0.0-0.0)

















YOUNG JETT MD May 17, 2017 14:44

## 2018-09-11 ENCOUNTER — HOSPITAL ENCOUNTER (EMERGENCY)
Dept: HOSPITAL 91 - FTE | Age: 78
Discharge: HOME | End: 2018-09-11
Payer: MEDICARE

## 2018-09-11 DIAGNOSIS — S42.211A: Primary | ICD-10-CM
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
## 2018-09-11 DIAGNOSIS — I10: ICD-10-CM

## 2018-09-11 DIAGNOSIS — Z85.3: ICD-10-CM

## 2018-09-11 PROCEDURE — 73564 X-RAY EXAM KNEE 4 OR MORE: CPT

## 2018-09-11 PROCEDURE — 73060 X-RAY EXAM OF HUMERUS: CPT

## 2018-09-11 PROCEDURE — 29105 APPLICATION LONG ARM SPLINT: CPT

## 2018-09-11 PROCEDURE — 99285 EMERGENCY DEPT VISIT HI MDM: CPT

## 2018-09-11 PROCEDURE — 96372 THER/PROPH/DIAG INJ SC/IM: CPT

## 2018-09-11 PROCEDURE — 70450 CT HEAD/BRAIN W/O DYE: CPT

## 2018-09-11 PROCEDURE — 72125 CT NECK SPINE W/O DYE: CPT

## 2018-09-11 RX ADMIN — ONDANSETRON 1 MG: 4 TABLET, ORALLY DISINTEGRATING ORAL at 12:15

## 2018-09-11 RX ADMIN — KETOROLAC TROMETHAMINE 1 MG: 30 INJECTION, SOLUTION INTRAMUSCULAR at 12:12
